# Patient Record
Sex: MALE | Race: ASIAN | NOT HISPANIC OR LATINO | URBAN - METROPOLITAN AREA
[De-identification: names, ages, dates, MRNs, and addresses within clinical notes are randomized per-mention and may not be internally consistent; named-entity substitution may affect disease eponyms.]

---

## 2022-09-07 ENCOUNTER — EMERGENCY (EMERGENCY)
Facility: HOSPITAL | Age: 42
LOS: 1 days | Discharge: HOME | End: 2022-09-07
Attending: EMERGENCY MEDICINE | Admitting: EMERGENCY MEDICINE
Payer: COMMERCIAL

## 2022-09-07 PROCEDURE — 99285 EMERGENCY DEPT VISIT HI MDM: CPT

## 2022-09-08 VITALS
WEIGHT: 184.09 LBS | DIASTOLIC BLOOD PRESSURE: 90 MMHG | RESPIRATION RATE: 18 BRPM | OXYGEN SATURATION: 99 % | HEART RATE: 89 BPM | TEMPERATURE: 99 F | SYSTOLIC BLOOD PRESSURE: 160 MMHG

## 2022-09-08 VITALS
HEART RATE: 57 BPM | DIASTOLIC BLOOD PRESSURE: 85 MMHG | OXYGEN SATURATION: 96 % | SYSTOLIC BLOOD PRESSURE: 130 MMHG | TEMPERATURE: 98 F | RESPIRATION RATE: 16 BRPM

## 2022-09-08 LAB
ALBUMIN SERPL ELPH-MCNC: 4 G/DL — SIGNIFICANT CHANGE UP (ref 3.3–5)
ALP SERPL-CCNC: 73 U/L — SIGNIFICANT CHANGE UP (ref 40–120)
ALT FLD-CCNC: 34 U/L — SIGNIFICANT CHANGE UP (ref 12–78)
ANION GAP SERPL CALC-SCNC: 6 MMOL/L — SIGNIFICANT CHANGE UP (ref 5–17)
APPEARANCE UR: ABNORMAL
AST SERPL-CCNC: 24 U/L — SIGNIFICANT CHANGE UP (ref 15–37)
BACTERIA # UR AUTO: ABNORMAL
BASOPHILS # BLD AUTO: 0.03 K/UL — SIGNIFICANT CHANGE UP (ref 0–0.2)
BASOPHILS NFR BLD AUTO: 0.2 % — SIGNIFICANT CHANGE UP (ref 0–2)
BILIRUB SERPL-MCNC: 0.4 MG/DL — SIGNIFICANT CHANGE UP (ref 0.2–1.2)
BILIRUB UR-MCNC: NEGATIVE — SIGNIFICANT CHANGE UP
BUN SERPL-MCNC: 18 MG/DL — SIGNIFICANT CHANGE UP (ref 7–23)
CALCIUM SERPL-MCNC: 9.2 MG/DL — SIGNIFICANT CHANGE UP (ref 8.5–10.1)
CHLORIDE SERPL-SCNC: 106 MMOL/L — SIGNIFICANT CHANGE UP (ref 96–108)
CO2 SERPL-SCNC: 27 MMOL/L — SIGNIFICANT CHANGE UP (ref 22–31)
COLOR SPEC: YELLOW — SIGNIFICANT CHANGE UP
CREAT SERPL-MCNC: 1.3 MG/DL — SIGNIFICANT CHANGE UP (ref 0.5–1.3)
DIFF PNL FLD: ABNORMAL
EGFR: 71 ML/MIN/1.73M2 — SIGNIFICANT CHANGE UP
EOSINOPHIL # BLD AUTO: 0.1 K/UL — SIGNIFICANT CHANGE UP (ref 0–0.5)
EOSINOPHIL NFR BLD AUTO: 0.8 % — SIGNIFICANT CHANGE UP (ref 0–6)
EPI CELLS # UR: NEGATIVE — SIGNIFICANT CHANGE UP
GLUCOSE SERPL-MCNC: 126 MG/DL — HIGH (ref 70–99)
GLUCOSE UR QL: NEGATIVE — SIGNIFICANT CHANGE UP
HCT VFR BLD CALC: 41 % — SIGNIFICANT CHANGE UP (ref 39–50)
HGB BLD-MCNC: 14.5 G/DL — SIGNIFICANT CHANGE UP (ref 13–17)
IMM GRANULOCYTES NFR BLD AUTO: 0.4 % — SIGNIFICANT CHANGE UP (ref 0–1.5)
KETONES UR-MCNC: NEGATIVE — SIGNIFICANT CHANGE UP
LEUKOCYTE ESTERASE UR-ACNC: ABNORMAL
LIDOCAIN IGE QN: 128 U/L — SIGNIFICANT CHANGE UP (ref 73–393)
LYMPHOCYTES # BLD AUTO: 1.93 K/UL — SIGNIFICANT CHANGE UP (ref 1–3.3)
LYMPHOCYTES # BLD AUTO: 15.5 % — SIGNIFICANT CHANGE UP (ref 13–44)
MCHC RBC-ENTMCNC: 30 PG — SIGNIFICANT CHANGE UP (ref 27–34)
MCHC RBC-ENTMCNC: 35.4 GM/DL — SIGNIFICANT CHANGE UP (ref 32–36)
MCV RBC AUTO: 84.9 FL — SIGNIFICANT CHANGE UP (ref 80–100)
MONOCYTES # BLD AUTO: 0.55 K/UL — SIGNIFICANT CHANGE UP (ref 0–0.9)
MONOCYTES NFR BLD AUTO: 4.4 % — SIGNIFICANT CHANGE UP (ref 2–14)
NEUTROPHILS # BLD AUTO: 9.76 K/UL — HIGH (ref 1.8–7.4)
NEUTROPHILS NFR BLD AUTO: 78.7 % — HIGH (ref 43–77)
NITRITE UR-MCNC: NEGATIVE — SIGNIFICANT CHANGE UP
NRBC # BLD: 0 /100 WBCS — SIGNIFICANT CHANGE UP (ref 0–0)
PH UR: 6.5 — SIGNIFICANT CHANGE UP (ref 5–8)
PLATELET # BLD AUTO: 197 K/UL — SIGNIFICANT CHANGE UP (ref 150–400)
POTASSIUM SERPL-MCNC: 4 MMOL/L — SIGNIFICANT CHANGE UP (ref 3.5–5.3)
POTASSIUM SERPL-SCNC: 4 MMOL/L — SIGNIFICANT CHANGE UP (ref 3.5–5.3)
PROT SERPL-MCNC: 7.7 G/DL — SIGNIFICANT CHANGE UP (ref 6–8.3)
PROT UR-MCNC: 15
RBC # BLD: 4.83 M/UL — SIGNIFICANT CHANGE UP (ref 4.2–5.8)
RBC # FLD: 12 % — SIGNIFICANT CHANGE UP (ref 10.3–14.5)
RBC CASTS # UR COMP ASSIST: ABNORMAL /HPF (ref 0–4)
SODIUM SERPL-SCNC: 139 MMOL/L — SIGNIFICANT CHANGE UP (ref 135–145)
SP GR SPEC: 1.01 — SIGNIFICANT CHANGE UP (ref 1.01–1.02)
UROBILINOGEN FLD QL: NEGATIVE — SIGNIFICANT CHANGE UP
WBC # BLD: 12.42 K/UL — HIGH (ref 3.8–10.5)
WBC # FLD AUTO: 12.42 K/UL — HIGH (ref 3.8–10.5)
WBC UR QL: ABNORMAL

## 2022-09-08 PROCEDURE — 99284 EMERGENCY DEPT VISIT MOD MDM: CPT | Mod: 25

## 2022-09-08 PROCEDURE — 85025 COMPLETE CBC W/AUTO DIFF WBC: CPT

## 2022-09-08 PROCEDURE — 87086 URINE CULTURE/COLONY COUNT: CPT

## 2022-09-08 PROCEDURE — 96375 TX/PRO/DX INJ NEW DRUG ADDON: CPT

## 2022-09-08 PROCEDURE — 80053 COMPREHEN METABOLIC PANEL: CPT

## 2022-09-08 PROCEDURE — 81001 URINALYSIS AUTO W/SCOPE: CPT

## 2022-09-08 PROCEDURE — 74176 CT ABD & PELVIS W/O CONTRAST: CPT | Mod: MA

## 2022-09-08 PROCEDURE — 36415 COLL VENOUS BLD VENIPUNCTURE: CPT

## 2022-09-08 PROCEDURE — 96361 HYDRATE IV INFUSION ADD-ON: CPT

## 2022-09-08 PROCEDURE — 96365 THER/PROPH/DIAG IV INF INIT: CPT

## 2022-09-08 PROCEDURE — 87186 SC STD MICRODIL/AGAR DIL: CPT

## 2022-09-08 PROCEDURE — 74176 CT ABD & PELVIS W/O CONTRAST: CPT | Mod: 26,MA

## 2022-09-08 PROCEDURE — 87077 CULTURE AEROBIC IDENTIFY: CPT

## 2022-09-08 PROCEDURE — 83690 ASSAY OF LIPASE: CPT

## 2022-09-08 RX ORDER — MORPHINE SULFATE 50 MG/1
4 CAPSULE, EXTENDED RELEASE ORAL ONCE
Refills: 0 | Status: DISCONTINUED | OUTPATIENT
Start: 2022-09-08 | End: 2022-09-08

## 2022-09-08 RX ORDER — TAMSULOSIN HYDROCHLORIDE 0.4 MG/1
1 CAPSULE ORAL
Qty: 10 | Refills: 0
Start: 2022-09-08 | End: 2022-09-17

## 2022-09-08 RX ORDER — SODIUM CHLORIDE 9 MG/ML
1000 INJECTION INTRAMUSCULAR; INTRAVENOUS; SUBCUTANEOUS
Refills: 0 | Status: COMPLETED | OUTPATIENT
Start: 2022-09-08 | End: 2022-09-08

## 2022-09-08 RX ORDER — CEFTRIAXONE 500 MG/1
1000 INJECTION, POWDER, FOR SOLUTION INTRAMUSCULAR; INTRAVENOUS ONCE
Refills: 0 | Status: COMPLETED | OUTPATIENT
Start: 2022-09-08 | End: 2022-09-08

## 2022-09-08 RX ORDER — CEFUROXIME AXETIL 250 MG
1 TABLET ORAL
Qty: 14 | Refills: 0
Start: 2022-09-08 | End: 2022-09-14

## 2022-09-08 RX ORDER — ONDANSETRON 8 MG/1
4 TABLET, FILM COATED ORAL ONCE
Refills: 0 | Status: COMPLETED | OUTPATIENT
Start: 2022-09-08 | End: 2022-09-08

## 2022-09-08 RX ORDER — OXYCODONE AND ACETAMINOPHEN 5; 325 MG/1; MG/1
2 TABLET ORAL
Qty: 36 | Refills: 0
Start: 2022-09-08 | End: 2022-09-10

## 2022-09-08 RX ADMIN — SODIUM CHLORIDE 1000 MILLILITER(S): 9 INJECTION INTRAMUSCULAR; INTRAVENOUS; SUBCUTANEOUS at 01:39

## 2022-09-08 RX ADMIN — MORPHINE SULFATE 4 MILLIGRAM(S): 50 CAPSULE, EXTENDED RELEASE ORAL at 01:39

## 2022-09-08 RX ADMIN — SODIUM CHLORIDE 1000 MILLILITER(S): 9 INJECTION INTRAMUSCULAR; INTRAVENOUS; SUBCUTANEOUS at 03:00

## 2022-09-08 RX ADMIN — SODIUM CHLORIDE 1000 MILLILITER(S): 9 INJECTION INTRAMUSCULAR; INTRAVENOUS; SUBCUTANEOUS at 02:46

## 2022-09-08 RX ADMIN — ONDANSETRON 4 MILLIGRAM(S): 8 TABLET, FILM COATED ORAL at 01:39

## 2022-09-08 RX ADMIN — MORPHINE SULFATE 4 MILLIGRAM(S): 50 CAPSULE, EXTENDED RELEASE ORAL at 02:09

## 2022-09-08 RX ADMIN — SODIUM CHLORIDE 1000 MILLILITER(S): 9 INJECTION INTRAMUSCULAR; INTRAVENOUS; SUBCUTANEOUS at 04:24

## 2022-09-08 RX ADMIN — CEFTRIAXONE 1000 MILLIGRAM(S): 500 INJECTION, POWDER, FOR SOLUTION INTRAMUSCULAR; INTRAVENOUS at 02:42

## 2022-09-08 RX ADMIN — CEFTRIAXONE 100 MILLIGRAM(S): 500 INJECTION, POWDER, FOR SOLUTION INTRAMUSCULAR; INTRAVENOUS at 02:08

## 2022-09-08 NOTE — ED PROVIDER NOTE - PATIENT PORTAL LINK FT
No You can access the FollowMyHealth Patient Portal offered by [facility] by registering at the following website: [email or web address]. By joining [employer]’s FollowMyHealth portal, you will also be able to view your health information using other applications (apps) compatible with our system.

## 2022-09-08 NOTE — ED ADULT TRIAGE NOTE - CHIEF COMPLAINT QUOTE
6yr old male arrived to ED c/o left flank pain and small amount of blood noted in urine, denies chest pain or shortness of breath

## 2022-09-08 NOTE — ED PROVIDER NOTE - CARE PROVIDER_API CALL
Alo Martínez)  Urology  [address], [address]  [place] [address]  Phone: [phone]  Fax: [phone]  Follow Up Time:

## 2022-09-08 NOTE — ED PROVIDER NOTE - CLINICAL SUMMARY MEDICAL DECISION MAKING FREE TEXT BOX
Patient is a 42-year-old male history of prior renal stones.  Patient states he awoke yesterday morning with left-sided flank pain that was colicky in nature and not worsened by any movements.  Pain initially was manageable with anti-inflammatories but returned yesterday evening.  Patient states pain radiates to left lower abdomen and has not increased by movement.  Patient denies fevers or chills nausea vomiting patient states tonight pain got very severe and came to the ER.  Pain is currently 4 out of 10 in intensity We will check labs, IV fluids, pain control and check UA as well as CT renal stone hunt to rule out kidney stone

## 2022-09-08 NOTE — ED ADULT NURSE NOTE - NSICDXFAMILYHX_GEN_ALL_CORE_FT
FAMILY HISTORY:  No pertinent family history in first degree relatives    
Leukocytosis (leucocytosis)

## 2022-09-08 NOTE — ED PROVIDER NOTE - NSFOLLOWUPINSTRUCTIONS_ED_ALL_ED_FT
Return immediately to the emergency department for shaking chills or fever over 100.5.  Drink lots of fluids.  Take Percocet 1 to 2 tablets every 4-6 hours as needed for moderate to severe pain.  Take Ceftin 1 tablet twice daily for 1 week.  Take Flomax 1 tablet before bed each night.  Call urology to arrange follow-up within the next 3 to 5 days.  Return as well for severe pain or uncontrolled vomiting 1) Follow-up with your Primary Medical Doctor , Dr Krishna. Call today for prompt follow-up.  2) Return to Emergency room for any worsening or persistent pain, weakness,  vomiting, diarrhea, unable to eat / drink, weak or dizzy, or any other concerning symptoms.   -If you have ANY FEVER, you must return to ED immediately  3) See attached instruction sheets for additional information, including information regarding signs and symptoms to look out for, reasons to seek immediate care and other important instructions.  4) Follow-up with Urology, see attached. Call ASAP for appointment.  5) Percocet as needed for pain, no driving / operating heavy machinery  6) Ceftin twice daily

## 2022-09-08 NOTE — ED PROVIDER NOTE - PROGRESS NOTE DETAILS
Reevaluated patient at bedside.  Patient feeling much improved, pain is well controlled.   At length dw pt - has not had any fevers. Discussed the results of all diagnostic testing in ED.  An opportunity to ask questions was given.  Discussed the nature of diagnostic testing in the abdomen and the importance of continued reevaluation.  Understanding of the potential risk of occult pathology was verbalized and the patient will continue to follow-up as an outpatient for further workup and evaluation until resolution.  Discussed the risks associated with kidney stones including the risk of developing an infected stone.  The patient understands that if they experience ANY fever, they will need to immediately return for possible surgical intervention. Discussed likelyhood of needing surg intervention due to size of stone. However, pt feeling well, wishes to go home now and will fu as outpt.  Discussed the importance of prompt, close medical follow-up.  Patient will return with any changes, concerns or persistent / worsening symptoms.  Understanding of all instructions verbalized.

## 2022-09-11 LAB
-  AMIKACIN: SIGNIFICANT CHANGE UP
-  AMOXICILLIN/CLAVULANIC ACID: SIGNIFICANT CHANGE UP
-  AMPICILLIN/SULBACTAM: SIGNIFICANT CHANGE UP
-  AMPICILLIN: SIGNIFICANT CHANGE UP
-  AMPICILLIN: SIGNIFICANT CHANGE UP
-  AZTREONAM: SIGNIFICANT CHANGE UP
-  CEFAZOLIN: SIGNIFICANT CHANGE UP
-  CEFEPIME: SIGNIFICANT CHANGE UP
-  CEFOXITIN: SIGNIFICANT CHANGE UP
-  CEFTRIAXONE: SIGNIFICANT CHANGE UP
-  CIPROFLOXACIN: SIGNIFICANT CHANGE UP
-  CIPROFLOXACIN: SIGNIFICANT CHANGE UP
-  ERTAPENEM: SIGNIFICANT CHANGE UP
-  GENTAMICIN: SIGNIFICANT CHANGE UP
-  IMIPENEM: SIGNIFICANT CHANGE UP
-  LEVOFLOXACIN: SIGNIFICANT CHANGE UP
-  LEVOFLOXACIN: SIGNIFICANT CHANGE UP
-  MEROPENEM: SIGNIFICANT CHANGE UP
-  NITROFURANTOIN: SIGNIFICANT CHANGE UP
-  NITROFURANTOIN: SIGNIFICANT CHANGE UP
-  PIPERACILLIN/TAZOBACTAM: SIGNIFICANT CHANGE UP
-  TETRACYCLINE: SIGNIFICANT CHANGE UP
-  TIGECYCLINE: SIGNIFICANT CHANGE UP
-  TOBRAMYCIN: SIGNIFICANT CHANGE UP
-  TRIMETHOPRIM/SULFAMETHOXAZOLE: SIGNIFICANT CHANGE UP
-  VANCOMYCIN: SIGNIFICANT CHANGE UP
CULTURE RESULTS: SIGNIFICANT CHANGE UP
METHOD TYPE: SIGNIFICANT CHANGE UP
METHOD TYPE: SIGNIFICANT CHANGE UP
ORGANISM # SPEC MICROSCOPIC CNT: SIGNIFICANT CHANGE UP
SPECIMEN SOURCE: SIGNIFICANT CHANGE UP

## 2022-09-11 NOTE — ED POST DISCHARGE NOTE - RESULT SUMMARY
UCx- Klebsiella Oxytoca/Raoutella/Enterococcus faecalis- pending sensitivities. Currently on Ceftin.

## 2023-01-11 ENCOUNTER — APPOINTMENT (OUTPATIENT)
Dept: UROLOGY | Facility: CLINIC | Age: 43
End: 2023-01-11

## 2023-01-11 PROBLEM — Z00.00 ENCOUNTER FOR PREVENTIVE HEALTH EXAMINATION: Status: ACTIVE | Noted: 2023-01-11

## 2023-02-23 PROBLEM — N20.0 CALCULUS OF KIDNEY: Chronic | Status: ACTIVE | Noted: 2022-09-08

## 2023-03-01 ENCOUNTER — OUTPATIENT (OUTPATIENT)
Dept: OUTPATIENT SERVICES | Facility: HOSPITAL | Age: 43
LOS: 1 days | End: 2023-03-01
Payer: COMMERCIAL

## 2023-03-01 ENCOUNTER — TRANSCRIPTION ENCOUNTER (OUTPATIENT)
Age: 43
End: 2023-03-01

## 2023-03-01 VITALS
RESPIRATION RATE: 16 BRPM | HEIGHT: 71 IN | HEART RATE: 82 BPM | OXYGEN SATURATION: 97 % | WEIGHT: 184.97 LBS | DIASTOLIC BLOOD PRESSURE: 95 MMHG | SYSTOLIC BLOOD PRESSURE: 129 MMHG | TEMPERATURE: 97 F

## 2023-03-01 DIAGNOSIS — Z01.818 ENCOUNTER FOR OTHER PREPROCEDURAL EXAMINATION: ICD-10-CM

## 2023-03-01 DIAGNOSIS — Z98.890 OTHER SPECIFIED POSTPROCEDURAL STATES: Chronic | ICD-10-CM

## 2023-03-01 DIAGNOSIS — N20.1 CALCULUS OF URETER: ICD-10-CM

## 2023-03-01 LAB
ANION GAP SERPL CALC-SCNC: 4 MMOL/L — LOW (ref 5–17)
APPEARANCE UR: CLEAR — SIGNIFICANT CHANGE UP
BILIRUB UR-MCNC: NEGATIVE — SIGNIFICANT CHANGE UP
BUN SERPL-MCNC: 10 MG/DL — SIGNIFICANT CHANGE UP (ref 7–23)
CALCIUM SERPL-MCNC: 9 MG/DL — SIGNIFICANT CHANGE UP (ref 8.5–10.1)
CHLORIDE SERPL-SCNC: 108 MMOL/L — SIGNIFICANT CHANGE UP (ref 96–108)
CO2 SERPL-SCNC: 28 MMOL/L — SIGNIFICANT CHANGE UP (ref 22–31)
COLOR SPEC: YELLOW — SIGNIFICANT CHANGE UP
CREAT SERPL-MCNC: 1 MG/DL — SIGNIFICANT CHANGE UP (ref 0.5–1.3)
DIFF PNL FLD: ABNORMAL
EGFR: 98 ML/MIN/1.73M2 — SIGNIFICANT CHANGE UP
GLUCOSE SERPL-MCNC: 110 MG/DL — HIGH (ref 70–99)
GLUCOSE UR QL: NEGATIVE — SIGNIFICANT CHANGE UP
HCT VFR BLD CALC: 43.7 % — SIGNIFICANT CHANGE UP (ref 39–50)
HGB BLD-MCNC: 15 G/DL — SIGNIFICANT CHANGE UP (ref 13–17)
KETONES UR-MCNC: NEGATIVE — SIGNIFICANT CHANGE UP
LEUKOCYTE ESTERASE UR-ACNC: ABNORMAL
MCHC RBC-ENTMCNC: 29.2 PG — SIGNIFICANT CHANGE UP (ref 27–34)
MCHC RBC-ENTMCNC: 34.3 GM/DL — SIGNIFICANT CHANGE UP (ref 32–36)
MCV RBC AUTO: 85 FL — SIGNIFICANT CHANGE UP (ref 80–100)
NITRITE UR-MCNC: NEGATIVE — SIGNIFICANT CHANGE UP
NRBC # BLD: 0 /100 WBCS — SIGNIFICANT CHANGE UP (ref 0–0)
PH UR: 6 — SIGNIFICANT CHANGE UP (ref 5–8)
PLATELET # BLD AUTO: 200 K/UL — SIGNIFICANT CHANGE UP (ref 150–400)
POTASSIUM SERPL-MCNC: 4.1 MMOL/L — SIGNIFICANT CHANGE UP (ref 3.5–5.3)
POTASSIUM SERPL-SCNC: 4.1 MMOL/L — SIGNIFICANT CHANGE UP (ref 3.5–5.3)
PROT UR-MCNC: 30 MG/DL
RBC # BLD: 5.14 M/UL — SIGNIFICANT CHANGE UP (ref 4.2–5.8)
RBC # FLD: 12.3 % — SIGNIFICANT CHANGE UP (ref 10.3–14.5)
SODIUM SERPL-SCNC: 140 MMOL/L — SIGNIFICANT CHANGE UP (ref 135–145)
SP GR SPEC: 1.02 — SIGNIFICANT CHANGE UP (ref 1.01–1.02)
UROBILINOGEN FLD QL: NEGATIVE — SIGNIFICANT CHANGE UP
WBC # BLD: 6.73 K/UL — SIGNIFICANT CHANGE UP (ref 3.8–10.5)
WBC # FLD AUTO: 6.73 K/UL — SIGNIFICANT CHANGE UP (ref 3.8–10.5)

## 2023-03-01 PROCEDURE — G0463: CPT

## 2023-03-01 PROCEDURE — 85027 COMPLETE CBC AUTOMATED: CPT

## 2023-03-01 PROCEDURE — 87086 URINE CULTURE/COLONY COUNT: CPT

## 2023-03-01 PROCEDURE — 36415 COLL VENOUS BLD VENIPUNCTURE: CPT

## 2023-03-01 PROCEDURE — 81001 URINALYSIS AUTO W/SCOPE: CPT

## 2023-03-01 PROCEDURE — 80048 BASIC METABOLIC PNL TOTAL CA: CPT

## 2023-03-01 NOTE — H&P PST ADULT - PROBLEM SELECTOR PLAN 1
Right flexible ureteroscopy - Laser  Pre op instructions:    medical eval advised, EKG done at PCP   Vaccinated for Covid   Take all prescribed meds as usual  Hold OTC supplements.   May take Tylenol for pain or headache if needed.    NPO after 11pm to the morning of surgery.   Covid testing advised 3 days prior to procedure, information given.  Patient verbalized understanding.

## 2023-03-01 NOTE — H&P PST ADULT - NSICDXPASTMEDICALHX_GEN_ALL_CORE_FT
PAST MEDICAL HISTORY:  Renal stone      PAST MEDICAL HISTORY:  CHELSEA (obstructive sleep apnea)     Renal stone     Tachycardia

## 2023-03-01 NOTE — H&P PST ADULT - NSICDXFAMILYHX_GEN_ALL_CORE_FT
FAMILY HISTORY:  Father  Still living? Yes, Estimated age: 61-70  FH: type 2 diabetes, Age at diagnosis: Age Unknown

## 2023-03-01 NOTE — H&P PST ADULT - NS [place] CAGE ALCOHOL ANNOYED YOU
Interval History:     Review of Systems   Constitution: Negative for decreased appetite.   HENT: Negative for ear discharge.    Eyes: Negative for blurred vision.   Endocrine: Negative for polyphagia.   Skin: Negative for nail changes.   Neurological: Negative for aphonia.   Psychiatric/Behavioral: Negative for hallucinations.     Objective:     Vital Signs (Most Recent):  Temp: 97.4 °F (36.3 °C) (01/19/18 0730)  Pulse: 82 (01/19/18 0730)  Resp: 18 (01/19/18 0730)  BP: (!) 154/99 (01/19/18 0730)  SpO2: 99 % (01/19/18 0730) Vital Signs (24h Range):  Temp:  [97.2 °F (36.2 °C)-98.3 °F (36.8 °C)] 97.4 °F (36.3 °C)  Pulse:  [] 82  Resp:  [16-69] 18  SpO2:  [96 %-100 %] 99 %  BP: (112-166)/(65-99) 154/99     Weight: 71 kg (156 lb 8.4 oz)  Body mass index is 25.26 kg/m².     SpO2: 99 %  O2 Device (Oxygen Therapy): room air      Intake/Output Summary (Last 24 hours) at 01/19/18 0843  Last data filed at 01/19/18 0458   Gross per 24 hour   Intake          1972.25 ml   Output              750 ml   Net          1222.25 ml       Lines/Drains/Airways     Peripheral Intravenous Line                 Peripheral IV - Single Lumen 01/17/18 1653 Right Wrist 1 day                Physical Exam   Constitutional: He is oriented to person, place, and time. He appears well-developed and well-nourished.   HENT:   Head: Normocephalic and atraumatic.   Eyes: Conjunctivae and EOM are normal. Pupils are equal, round, and reactive to light.   Neck: Normal range of motion. Neck supple. No thyromegaly present.   Cardiovascular: Normal rate, regular rhythm and normal heart sounds.    No murmur heard.  Pulmonary/Chest: Effort normal and breath sounds normal. No respiratory distress. He has no wheezes. He has no rales. He exhibits no tenderness.   Abdominal: Soft. Bowel sounds are normal.   Musculoskeletal: He exhibits no edema.   Neurological: He is alert and oriented to person, place, and time.   Skin: Skin is warm.   Psychiatric: He has a  normal mood and affect. His behavior is normal.       Significant Labs: All pertinent lab results from the last 24 hours have been reviewed.    Significant Imaging: Echocardiogram:   2D echo with color flow doppler:   Results for orders placed or performed during the hospital encounter of 01/17/18   2D echo with color flow doppler   Result Value Ref Range    EF 20 (A) 55 - 65    Mitral Valve Regurgitation MODERATE TO SEVERE (A)     Est. PA Systolic Pressure 45.45 (A)     Pericardial Effusion NONE     Tricuspid Valve Regurgitation MILD TO MODERATE       no

## 2023-03-01 NOTE — H&P PST ADULT - MUSCULOSKELETAL
normal/ROM intact/normal gait/strength 5/5 bilateral upper extremities/strength 5/5 bilateral lower extremities negative normal/ROM intact/normal gait/strength 5/5 bilateral upper extremities/strength 5/5 bilateral lower extremities/back exam

## 2023-03-01 NOTE — H&P PST ADULT - HISTORY OF PRESENT ILLNESS
43 y/o male, PMH of HTN, HLD with c/o of right flank pain for 6 months. Was seen in the ER in sep 2022, CT scan showed right kidney stone. Seen by Dr Martínez, few weeks ago, scheduled for right flexible ureteroscopy,  laser on 3/8/23. Pre op testing today.   43 y/o male, PMH of HTN, HLD with c/o of right flank pain for 6 months. Was seen in the ER in sep 2022, CT scan showed right kidney stone. Seen by Dr Martínez, few weeks ago, cat scan repeated, showing right hydronephrosis  ands ureteral stone scheduled for right flexible ureteroscopy,  laser on 3/8/23. Pre op testing today.

## 2023-03-02 PROBLEM — G47.33 OBSTRUCTIVE SLEEP APNEA (ADULT) (PEDIATRIC): Chronic | Status: ACTIVE | Noted: 2023-03-01

## 2023-03-02 PROBLEM — R00.0 TACHYCARDIA, UNSPECIFIED: Chronic | Status: ACTIVE | Noted: 2023-03-01

## 2023-03-02 LAB
CULTURE RESULTS: SIGNIFICANT CHANGE UP
SPECIMEN SOURCE: SIGNIFICANT CHANGE UP

## 2023-03-06 ENCOUNTER — OUTPATIENT (OUTPATIENT)
Dept: OUTPATIENT SERVICES | Facility: HOSPITAL | Age: 43
LOS: 1 days | End: 2023-03-06
Payer: COMMERCIAL

## 2023-03-06 DIAGNOSIS — Z98.890 OTHER SPECIFIED POSTPROCEDURAL STATES: Chronic | ICD-10-CM

## 2023-03-06 DIAGNOSIS — Z20.828 CONTACT WITH AND (SUSPECTED) EXPOSURE TO OTHER VIRAL COMMUNICABLE DISEASES: ICD-10-CM

## 2023-03-06 LAB — SARS-COV-2 RNA SPEC QL NAA+PROBE: SIGNIFICANT CHANGE UP

## 2023-03-06 PROCEDURE — U0005: CPT

## 2023-03-06 PROCEDURE — U0003: CPT

## 2023-03-07 ENCOUNTER — TRANSCRIPTION ENCOUNTER (OUTPATIENT)
Age: 43
End: 2023-03-07

## 2023-03-07 NOTE — ASU PATIENT PROFILE, ADULT - TRANSFUSION HX COMMENT, PROFILE
Chief Complaint   Patient presents with    3 Month Follow-Up    Hypertension       HPI: Patient is here today to follow-up hypertension and other medical issues he seems better he does not comment as much on the pelvic floor dysfunction his mood is better and he says his blood pressure is doing okay the main reason he is here for follow-up today is hypertension he follows with Dr. Nakul Back for depression bipolar disorder.     Past Medical History:   Diagnosis Date    Arthritis     Bipolar disorder ([address]     BPH (benign prostatic hypertrophy)     Constipation     Depression     GERD (gastroesophageal reflux disease)     Headache(784.0)     History of blood transfusion     Hypertension     Pelvic floor dysfunction        Past Surgical History:   Procedure Laterality Date    ACHILLES TENDON SURGERY      CHOLECYSTECTOMY      COLONOSCOPY N/A 3/14/2019    Dr Everett Pastures yr recall    ENDOSCOPY, COLON, DIAGNOSTIC      JOINT REPLACEMENT      left ankle replacement    TONSILLECTOMY AND ADENOIDECTOMY         Family History   Problem Relation Age of Onset    Colon Cancer Neg Hx     Colon Polyps Neg Hx     Liver Cancer Neg Hx     Liver Disease Neg Hx     Esophageal Cancer Neg Hx     Stomach Cancer Neg Hx     Rectal Cancer Neg Hx        Social History     Socioeconomic History    Marital status:      Spouse name: Not on file    Number of children: Not on file    Years of education: Not on file    Highest education level: Not on file   Occupational History    Not on file   Social Needs    Financial resource strain: Not hard at all   Offermatic insecurity     Worry: Never true     Inability: Never true   [employer] needs     Medical: Not on file     Non-medical: Not on file   Tobacco Use    Smoking status: Current Every Day Smoker     Packs/day: 0.25     Years: 30.00     Pack years: 7.50     Types: Cigarettes    Smokeless tobacco: Never Used    Tobacco comment: 4 cigarettes per day   Substance and Sexual Activity    Alcohol use: No    Drug use: No    Sexual activity: Not on file   Lifestyle    Physical activity     Days per week: Not on file     Minutes per session: Not on file    Stress: Not on file   Relationships    Social connections     Talks on phone: Not on file     Gets together: Not on file     Attends Christian service: Not on file     Active member of club or organization: Not on file     Attends meetings of clubs or organizations: Not on file     Relationship status: Not on file    Intimate partner violence     Fear of current or ex partner: Not on file     Emotionally abused: Not on file     Physically abused: Not on file     Forced sexual activity: Not on file   Other Topics Concern    Not on file   Social History Narrative    Not on file       No Known Allergies    Current Outpatient Medications   Medication Sig Dispense Refill    amLODIPine (NORVASC) 5 MG tablet Take 1 tablet by mouth nightly 30 tablet 5    sildenafil (VIAGRA) 50 MG tablet Take 1 tablet by mouth as needed for Erectile Dysfunction (do not exceed 1 in 24h) 30 tablet 0    Prucalopride Succinate (MOTEGRITY) 2 MG TABS TAKE 1 TABLET BY MOUTH DAILY 30 tablet 3    losartan-hydroCHLOROthiazide (HYZAAR) 100-25 MG per tablet Take 1 tablet by mouth daily 90 tablet 3    RABEprazole (ACIPHEX) 20 MG tablet TAKE 1 TABLET DAILY 90 tablet 3    buPROPion (WELLBUTRIN XL) 300 MG extended release tablet TAKE 1 TABLET BY MOUTH EVERY MORNING 90 tablet 3    lamoTRIgine (LAMICTAL) 200 MG tablet TAKE 1 TABLET BY MOUTH DAILY 90 tablet 3    SUMAtriptan (IMITREX) 100 MG tablet Take 1 tablet by mouth as needed for Migraine 9 tablet 3    folic acid (FOLVITE) 313 MCG tablet Take 400 mcg by mouth daily      aspirin 325 MG tablet Take 325 mg by mouth daily      Multiple Vitamins-Minerals (MULTIVITAMIN ADULT PO) Take 1 tablet by mouth daily       LORazepam (ATIVAN) 0.5 MG tablet TAKE 1 TABLET BY MOUTH EVERY Ativan    3.  Pelvic floor dysfunction  He takes [address] for this and he has not really complained of this since he has been on [employer] I think it is working well    Recommend the COVID-19 vaccine he had the J&J vaccine on March 16 pt. consents to blood transfusion if needed

## 2023-03-08 ENCOUNTER — TRANSCRIPTION ENCOUNTER (OUTPATIENT)
Age: 43
End: 2023-03-08

## 2023-03-08 ENCOUNTER — OUTPATIENT (OUTPATIENT)
Dept: OUTPATIENT SERVICES | Facility: HOSPITAL | Age: 43
LOS: 1 days | End: 2023-03-08
Payer: COMMERCIAL

## 2023-03-08 VITALS
TEMPERATURE: 98 F | WEIGHT: 188.05 LBS | DIASTOLIC BLOOD PRESSURE: 85 MMHG | OXYGEN SATURATION: 95 % | RESPIRATION RATE: 16 BRPM | HEART RATE: 86 BPM | SYSTOLIC BLOOD PRESSURE: 121 MMHG

## 2023-03-08 VITALS
RESPIRATION RATE: 14 BRPM | OXYGEN SATURATION: 96 % | DIASTOLIC BLOOD PRESSURE: 72 MMHG | HEART RATE: 76 BPM | SYSTOLIC BLOOD PRESSURE: 127 MMHG

## 2023-03-08 DIAGNOSIS — Z98.890 OTHER SPECIFIED POSTPROCEDURAL STATES: Chronic | ICD-10-CM

## 2023-03-08 DIAGNOSIS — N20.1 CALCULUS OF URETER: ICD-10-CM

## 2023-03-08 PROCEDURE — C1769: CPT

## 2023-03-08 PROCEDURE — 76000 FLUOROSCOPY <1 HR PHYS/QHP: CPT

## 2023-03-08 PROCEDURE — 52005 CYSTO W/URTRL CATHJ: CPT | Mod: 74

## 2023-03-08 PROCEDURE — C1758: CPT

## 2023-03-08 DEVICE — GUIDEWIRE SENSOR NITINOL STRAIGHT .035" X 150CM: Type: IMPLANTABLE DEVICE | Site: RIGHT | Status: FUNCTIONAL

## 2023-03-08 DEVICE — URETERAL CATH OPEN END FLEXI-TIP 5FR .038" X 70CM: Type: IMPLANTABLE DEVICE | Site: RIGHT | Status: FUNCTIONAL

## 2023-03-08 DEVICE — GUIDEWIRE ZIPWIRE STANDARD STRAIGHT .038" X 150CM: Type: IMPLANTABLE DEVICE | Site: RIGHT | Status: FUNCTIONAL

## 2023-03-08 RX ORDER — ONDANSETRON 8 MG/1
4 TABLET, FILM COATED ORAL ONCE
Refills: 0 | Status: DISCONTINUED | OUTPATIENT
Start: 2023-03-08 | End: 2023-03-08

## 2023-03-08 RX ORDER — SODIUM CHLORIDE 9 MG/ML
1000 INJECTION, SOLUTION INTRAVENOUS
Refills: 0 | Status: DISCONTINUED | OUTPATIENT
Start: 2023-03-08 | End: 2023-03-08

## 2023-03-08 RX ORDER — CEFAZOLIN SODIUM 1 G
VIAL (EA) INJECTION
Refills: 0 | Status: DISCONTINUED | OUTPATIENT
Start: 2023-03-08 | End: 2023-03-08

## 2023-03-08 RX ORDER — CEFAZOLIN SODIUM 1 G
2000 VIAL (EA) INJECTION ONCE
Refills: 0 | Status: COMPLETED | OUTPATIENT
Start: 2023-03-08 | End: 2023-03-08

## 2023-03-08 RX ORDER — OXYCODONE HYDROCHLORIDE 5 MG/1
5 TABLET ORAL ONCE
Refills: 0 | Status: DISCONTINUED | OUTPATIENT
Start: 2023-03-08 | End: 2023-03-08

## 2023-03-08 RX ORDER — HYDROMORPHONE HYDROCHLORIDE 2 MG/ML
0.5 INJECTION INTRAMUSCULAR; INTRAVENOUS; SUBCUTANEOUS
Refills: 0 | Status: DISCONTINUED | OUTPATIENT
Start: 2023-03-08 | End: 2023-03-08

## 2023-03-08 RX ORDER — CEFUROXIME AXETIL 250 MG
1 TABLET ORAL
Qty: 10 | Refills: 0
Start: 2023-03-08 | End: 2023-03-12

## 2023-03-08 RX ADMIN — SODIUM CHLORIDE 60 MILLILITER(S): 9 INJECTION, SOLUTION INTRAVENOUS at 12:19

## 2023-03-08 RX ADMIN — SODIUM CHLORIDE 75 MILLILITER(S): 9 INJECTION, SOLUTION INTRAVENOUS at 14:52

## 2023-03-08 NOTE — ASU DISCHARGE PLAN (ADULT/PEDIATRIC) - NS MD DC FALL RISK RISK
For information on Fall & Injury Prevention, visit: [email or web address] OR  [email or web address] OR  https://www.cdc.gov/steadi/patient.html

## 2023-03-08 NOTE — BRIEF OPERATIVE NOTE - OPERATION/FINDINGS
large proximal  rt ureteral calculus, stone could not be bypassed with guide wire, [id number] terminated

## 2023-03-08 NOTE — ASU DISCHARGE PLAN (ADULT/PEDIATRIC) - CARE PROVIDER_API CALL
Alo Martínez)  Urology  [address], [address]  [place] [address]  Phone: [phone]  Fax: [phone]  Follow Up Time: 1 week

## 2023-04-28 ENCOUNTER — APPOINTMENT (OUTPATIENT)
Dept: ORTHOPEDIC SURGERY | Facility: CLINIC | Age: 43
End: 2023-04-28
Payer: COMMERCIAL

## 2023-04-28 DIAGNOSIS — M75.41 IMPINGEMENT SYNDROME OF RIGHT SHOULDER: ICD-10-CM

## 2023-04-28 PROCEDURE — 99204 OFFICE O/P NEW MOD 45 MIN: CPT

## 2023-04-28 PROCEDURE — 73030 X-RAY EXAM OF SHOULDER: CPT | Mod: RT

## 2023-04-28 RX ORDER — METFORMIN HYDROCHLORIDE 500 MG/1
240 TABLET, EXTENDED RELEASE ORAL
Refills: 0 | Status: ACTIVE | COMMUNITY

## 2023-04-28 NOTE — HISTORY OF PRESENT ILLNESS
[Left Arm] : left arm [Right Arm] : right arm [Gradual] : gradual [Sudden] : sudden [7] : 7 [6] : 6 [Burning] : burning [Shooting] : shooting [Stabbing] : stabbing [Intermittent] : intermittent [Rest] : rest [Exercising] : exercising [de-identified] : 4/28/23  Initial visit for this 43 year male RHD c/o numbness lt index finger only at night x last 1 months duration. No wake up pain at night. Has no sxs in other fingers.\par \par  Also c/o rt shoulder pain when attempting to throw overhead x last 6-8 months duration. No pain at rest. Can lie on rt side at night. NO wake up pain at night. 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

## 2023-04-28 NOTE — REASON FOR VISIT
[FreeTextEntry2] : Follow up visit for a new injury to the left pointer finger and the right shoulder

## 2023-05-14 ENCOUNTER — FORM ENCOUNTER (OUTPATIENT)
Age: 43
End: 2023-05-14

## 2023-05-15 ENCOUNTER — APPOINTMENT (OUTPATIENT)
Dept: MRI IMAGING | Facility: CLINIC | Age: 43
End: 2023-05-15
Payer: COMMERCIAL

## 2023-05-15 PROCEDURE — 73221 MRI JOINT UPR EXTREM W/O DYE: CPT | Mod: RT

## 2023-05-19 ENCOUNTER — APPOINTMENT (OUTPATIENT)
Dept: ORTHOPEDIC SURGERY | Facility: CLINIC | Age: 43
End: 2023-05-19

## 2023-05-23 ENCOUNTER — APPOINTMENT (OUTPATIENT)
Dept: ORTHOPEDIC SURGERY | Facility: CLINIC | Age: 43
End: 2023-05-23

## 2023-08-29 ENCOUNTER — OUTPATIENT (OUTPATIENT)
Dept: OUTPATIENT SERVICES | Facility: HOSPITAL | Age: 43
LOS: 1 days | End: 2023-08-29
Payer: COMMERCIAL

## 2023-08-29 VITALS
DIASTOLIC BLOOD PRESSURE: 86 MMHG | WEIGHT: 119.05 LBS | TEMPERATURE: 98 F | SYSTOLIC BLOOD PRESSURE: 148 MMHG | HEIGHT: 70 IN | RESPIRATION RATE: 16 BRPM | HEART RATE: 90 BPM | OXYGEN SATURATION: 98 %

## 2023-08-29 DIAGNOSIS — Z86.69 PERSONAL HISTORY OF OTHER DISEASES OF THE NERVOUS SYSTEM AND SENSE ORGANS: ICD-10-CM

## 2023-08-29 DIAGNOSIS — Z98.890 OTHER SPECIFIED POSTPROCEDURAL STATES: Chronic | ICD-10-CM

## 2023-08-29 DIAGNOSIS — N20.0 CALCULUS OF KIDNEY: ICD-10-CM

## 2023-08-29 DIAGNOSIS — N20.1 CALCULUS OF URETER: ICD-10-CM

## 2023-08-29 LAB
ANION GAP SERPL CALC-SCNC: 10 MMOL/L — SIGNIFICANT CHANGE UP (ref 7–14)
APPEARANCE UR: CLEAR — SIGNIFICANT CHANGE UP
BACTERIA # UR AUTO: NEGATIVE /HPF — SIGNIFICANT CHANGE UP
BILIRUB UR-MCNC: NEGATIVE — SIGNIFICANT CHANGE UP
BLD GP AB SCN SERPL QL: NEGATIVE — SIGNIFICANT CHANGE UP
BUN SERPL-MCNC: 12 MG/DL — SIGNIFICANT CHANGE UP (ref 7–23)
CALCIUM SERPL-MCNC: 9.3 MG/DL — SIGNIFICANT CHANGE UP (ref 8.4–10.5)
CAST: 0 /LPF — SIGNIFICANT CHANGE UP (ref 0–4)
CHLORIDE SERPL-SCNC: 102 MMOL/L — SIGNIFICANT CHANGE UP (ref 98–107)
CO2 SERPL-SCNC: 30 MMOL/L — SIGNIFICANT CHANGE UP (ref 22–31)
COLOR SPEC: SIGNIFICANT CHANGE UP
CREAT SERPL-MCNC: 1.01 MG/DL — SIGNIFICANT CHANGE UP (ref 0.5–1.3)
DIFF PNL FLD: NEGATIVE — SIGNIFICANT CHANGE UP
EGFR: 96 ML/MIN/1.73M2 — SIGNIFICANT CHANGE UP
GLUCOSE SERPL-MCNC: 120 MG/DL — HIGH (ref 70–99)
GLUCOSE UR QL: NEGATIVE MG/DL — SIGNIFICANT CHANGE UP
HCT VFR BLD CALC: 43.4 % — SIGNIFICANT CHANGE UP (ref 39–50)
HGB BLD-MCNC: 15.1 G/DL — SIGNIFICANT CHANGE UP (ref 13–17)
KETONES UR-MCNC: NEGATIVE MG/DL — SIGNIFICANT CHANGE UP
LEUKOCYTE ESTERASE UR-ACNC: ABNORMAL
MCHC RBC-ENTMCNC: 29.3 PG — SIGNIFICANT CHANGE UP (ref 27–34)
MCHC RBC-ENTMCNC: 34.8 GM/DL — SIGNIFICANT CHANGE UP (ref 32–36)
MCV RBC AUTO: 84.3 FL — SIGNIFICANT CHANGE UP (ref 80–100)
NITRITE UR-MCNC: NEGATIVE — SIGNIFICANT CHANGE UP
NRBC # BLD: 0 /100 WBCS — SIGNIFICANT CHANGE UP (ref 0–0)
NRBC # FLD: 0 K/UL — SIGNIFICANT CHANGE UP (ref 0–0)
PH UR: 6.5 — SIGNIFICANT CHANGE UP (ref 5–8)
PLATELET # BLD AUTO: 196 K/UL — SIGNIFICANT CHANGE UP (ref 150–400)
POTASSIUM SERPL-MCNC: 3.6 MMOL/L — SIGNIFICANT CHANGE UP (ref 3.5–5.3)
POTASSIUM SERPL-SCNC: 3.6 MMOL/L — SIGNIFICANT CHANGE UP (ref 3.5–5.3)
PROT UR-MCNC: SIGNIFICANT CHANGE UP MG/DL
RBC # BLD: 5.15 M/UL — SIGNIFICANT CHANGE UP (ref 4.2–5.8)
RBC # FLD: 12.1 % — SIGNIFICANT CHANGE UP (ref 10.3–14.5)
RBC CASTS # UR COMP ASSIST: 2 /HPF — SIGNIFICANT CHANGE UP (ref 0–4)
RH IG SCN BLD-IMP: POSITIVE — SIGNIFICANT CHANGE UP
SODIUM SERPL-SCNC: 142 MMOL/L — SIGNIFICANT CHANGE UP (ref 135–145)
SP GR SPEC: 1.02 — SIGNIFICANT CHANGE UP (ref 1–1.03)
SQUAMOUS # UR AUTO: 0 /HPF — SIGNIFICANT CHANGE UP (ref 0–5)
UROBILINOGEN FLD QL: 0.2 MG/DL — SIGNIFICANT CHANGE UP (ref 0.2–1)
WBC # BLD: 7.24 K/UL — SIGNIFICANT CHANGE UP (ref 3.8–10.5)
WBC # FLD AUTO: 7.24 K/UL — SIGNIFICANT CHANGE UP (ref 3.8–10.5)
WBC UR QL: 11 /HPF — HIGH (ref 0–5)

## 2023-08-29 PROCEDURE — 93010 ELECTROCARDIOGRAM REPORT: CPT

## 2023-08-29 RX ORDER — ROSUVASTATIN CALCIUM 5 MG/1
1 TABLET ORAL
Qty: 0 | Refills: 0 | DISCHARGE

## 2023-08-29 NOTE — H&P PST ADULT - NEGATIVE ENMT SYMPTOMS
no hearing difficulty/no ear pain/no tinnitus/no vertigo/no nasal congestion/no nasal discharge/no nasal obstruction/no nose bleeds

## 2023-08-29 NOTE — H&P PST ADULT - MUSCULOSKELETAL
negative normal/ROM intact/normal gait/strength 5/5 bilateral upper extremities/strength 5/5 bilateral lower extremities/back exam details…

## 2023-08-29 NOTE — H&P PST ADULT - NSICDXPASTMEDICALHX_GEN_ALL_CORE_FT
PAST MEDICAL HISTORY:  CHELSEA (obstructive sleep apnea)     Renal stone     Tachycardia      PAST MEDICAL HISTORY:  HLD (hyperlipidemia)     HTN (hypertension)     CHELSEA (obstructive sleep apnea)     Renal stone     Tachycardia

## 2023-08-29 NOTE — H&P PST ADULT - NEGATIVE SKIN SYMPTOMS
"Initial /67  Pulse 58  Temp 98.4  F (36.9  C) (Tympanic)  Ht 5' 7\" (1.702 m)  Wt 159 lb (72.1 kg)  BMI 24.9 kg/m2 Estimated body mass index is 24.9 kg/(m^2) as calculated from the following:    Height as of this encounter: 5' 7\" (1.702 m).    Weight as of this encounter: 159 lb (72.1 kg). .    Anabelle Fierro CMA ([facility])  "
no rash/no itching/no dryness

## 2023-08-29 NOTE — H&P PST ADULT - HISTORY OF PRESENT ILLNESS
43 y/o male, PMH of HTN, HLD with c/o of right flank pain for 6 months. Was seen in the ER in sep 2022, CT scan showed right kidney stone. Seen by Dr Martínez, few weeks ago, cat scan repeated, showing right hydronephrosis  ands ureteral stone scheduled for right flexible ureteroscopy,  laser on 3/8/23. Pre op testing today.   44 y/o male, PMH of HTN, HLD with c/o of right flank pain for 6 months. Was seen in the ER in sep 2022, CT scan showed right kidney stone. Seen by Dr Martínez, cat scan repeated, showing right hydronephrosis  ands ureteral stone. Pt had  right flexible ureteroscopy,  laser on 3/8/23 at [facility] but as per pt the stone could not be removed. Pt presents for preop eval to have right percutaneous nephrolithotomy with fluoroscopy on 9/14/23.

## 2023-08-29 NOTE — H&P PST ADULT - PROBLEM SELECTOR PLAN 2
CPAP machine was not advised for th ept - as per the pt.  Intermediate risk for CHELSEA identified by screening tool.   Airway precautions recommended.

## 2023-08-29 NOTE — H&P PST ADULT - PROBLEM SELECTOR PLAN 1
Right flexible ureteroscopy - Laser  Pre op instructions:    medical eval advised, EKG done at PCP   Vaccinated for Covid   Take all prescribed meds as usual  Hold OTC supplements.   May take Tylenol for pain or headache if needed.    NPO after 11pm to the morning of surgery.   Covid testing advised 3 days prior to procedure, information given.  Patient verbalized understanding. Pt presents for preop eval to have right percutaneous nephrolithotomy with fluoroscopy on 9/14/23.   labs pending, ekg in chart.  Preop instructions provided to pt.  Famotidine and chlorhexidine scrubs provided to pt with instructions.

## 2023-08-29 NOTE — H&P PST ADULT - NSICDXPASTSURGICALHX_GEN_ALL_CORE_FT
PAST SURGICAL HISTORY:  S/P left inguinal hernia repair      PAST SURGICAL HISTORY:  History of ureteroscopy     S/P left inguinal hernia repair

## 2023-08-31 LAB
CULTURE RESULTS: NO GROWTH — SIGNIFICANT CHANGE UP
SPECIMEN SOURCE: SIGNIFICANT CHANGE UP

## 2023-09-12 ENCOUNTER — RESULT REVIEW (OUTPATIENT)
Age: 43
End: 2023-09-12

## 2023-09-12 ENCOUNTER — APPOINTMENT (OUTPATIENT)
Dept: INTERVENTIONAL RADIOLOGY/VASCULAR | Facility: CLINIC | Age: 43
End: 2023-09-12
Payer: MEDICARE

## 2023-09-12 VITALS — HEIGHT: 70.5 IN | BODY MASS INDEX: 25.9 KG/M2 | WEIGHT: 182.98 LBS

## 2023-09-12 DIAGNOSIS — Z84.1 FAMILY HISTORY OF DISORDERS OF KIDNEY AND URETER: ICD-10-CM

## 2023-09-12 DIAGNOSIS — Z78.9 OTHER SPECIFIED HEALTH STATUS: ICD-10-CM

## 2023-09-12 DIAGNOSIS — I10 ESSENTIAL (PRIMARY) HYPERTENSION: ICD-10-CM

## 2023-09-12 DIAGNOSIS — N20.0 CALCULUS OF KIDNEY: ICD-10-CM

## 2023-09-12 PROBLEM — E78.5 HYPERLIPIDEMIA, UNSPECIFIED: Chronic | Status: ACTIVE | Noted: 2023-08-29

## 2023-09-12 PROCEDURE — 99203 OFFICE O/P NEW LOW 30 MIN: CPT | Mod: 95

## 2023-09-13 ENCOUNTER — APPOINTMENT (OUTPATIENT)
Dept: CT IMAGING | Facility: HOSPITAL | Age: 43
End: 2023-09-13

## 2023-09-13 ENCOUNTER — TRANSCRIPTION ENCOUNTER (OUTPATIENT)
Age: 43
End: 2023-09-13

## 2023-09-13 ENCOUNTER — INPATIENT (INPATIENT)
Facility: HOSPITAL | Age: 43
LOS: 0 days | Discharge: HOME | End: 2023-09-14
Attending: UROLOGY | Admitting: UROLOGY
Payer: COMMERCIAL

## 2023-09-13 ENCOUNTER — OUTPATIENT (OUTPATIENT)
Dept: OUTPATIENT SERVICES | Facility: HOSPITAL | Age: 43
LOS: 1 days | End: 2023-09-13

## 2023-09-13 VITALS — TEMPERATURE: 98 F | RESPIRATION RATE: 11 BRPM | HEART RATE: 77 BPM | OXYGEN SATURATION: 98 %

## 2023-09-13 DIAGNOSIS — Z98.890 OTHER SPECIFIED POSTPROCEDURAL STATES: Chronic | ICD-10-CM

## 2023-09-13 DIAGNOSIS — N20.0 CALCULUS OF KIDNEY: ICD-10-CM

## 2023-09-13 PROCEDURE — 50433 PLMT NEPHROURETERAL CATHETER: CPT | Mod: RT

## 2023-09-13 RX ORDER — INFLUENZA VIRUS VACCINE 15; 15; 15; 15 UG/.5ML; UG/.5ML; UG/.5ML; UG/.5ML
0.5 SUSPENSION INTRAMUSCULAR ONCE
Refills: 0 | Status: COMPLETED | OUTPATIENT
Start: 2023-09-13 | End: 2023-09-13

## 2023-09-13 RX ORDER — DILTIAZEM HCL 120 MG
240 CAPSULE, EXT RELEASE 24 HR ORAL DAILY
Refills: 0 | Status: DISCONTINUED | OUTPATIENT
Start: 2023-09-13 | End: 2023-09-14

## 2023-09-13 RX ORDER — DILTIAZEM HCL 120 MG
240 CAPSULE, EXT RELEASE 24 HR ORAL
Qty: 0 | Refills: 0 | DISCHARGE

## 2023-09-13 RX ORDER — METOCLOPRAMIDE HCL 10 MG
10 TABLET ORAL ONCE
Refills: 0 | Status: DISCONTINUED | OUTPATIENT
Start: 2023-09-13 | End: 2023-09-14

## 2023-09-13 RX ORDER — OXYCODONE HYDROCHLORIDE 5 MG/1
5 TABLET ORAL EVERY 4 HOURS
Refills: 0 | Status: DISCONTINUED | OUTPATIENT
Start: 2023-09-13 | End: 2023-09-14

## 2023-09-13 RX ORDER — CEFAZOLIN SODIUM 1 G
1000 VIAL (EA) INJECTION EVERY 8 HOURS
Refills: 0 | Status: DISCONTINUED | OUTPATIENT
Start: 2023-09-14 | End: 2023-09-14

## 2023-09-13 RX ORDER — HYDROMORPHONE HYDROCHLORIDE 2 MG/ML
0.5 INJECTION INTRAMUSCULAR; INTRAVENOUS; SUBCUTANEOUS
Refills: 0 | Status: DISCONTINUED | OUTPATIENT
Start: 2023-09-13 | End: 2023-09-13

## 2023-09-13 RX ORDER — ONDANSETRON 8 MG/1
4 TABLET, FILM COATED ORAL EVERY 6 HOURS
Refills: 0 | Status: DISCONTINUED | OUTPATIENT
Start: 2023-09-13 | End: 2023-09-14

## 2023-09-13 RX ORDER — HEPARIN SODIUM 5000 [USP'U]/ML
5000 INJECTION INTRAVENOUS; SUBCUTANEOUS EVERY 8 HOURS
Refills: 0 | Status: DISCONTINUED | OUTPATIENT
Start: 2023-09-13 | End: 2023-09-14

## 2023-09-13 RX ORDER — OXYCODONE HYDROCHLORIDE 5 MG/1
5 TABLET ORAL ONCE
Refills: 0 | Status: DISCONTINUED | OUTPATIENT
Start: 2023-09-13 | End: 2023-09-13

## 2023-09-13 RX ORDER — KETOROLAC TROMETHAMINE 30 MG/ML
15 SYRINGE (ML) INJECTION EVERY 6 HOURS
Refills: 0 | Status: DISCONTINUED | OUTPATIENT
Start: 2023-09-13 | End: 2023-09-14

## 2023-09-13 RX ORDER — ACETAMINOPHEN 500 MG
975 TABLET ORAL EVERY 6 HOURS
Refills: 0 | Status: DISCONTINUED | OUTPATIENT
Start: 2023-09-13 | End: 2023-09-14

## 2023-09-13 RX ORDER — ONDANSETRON 8 MG/1
4 TABLET, FILM COATED ORAL ONCE
Refills: 0 | Status: COMPLETED | OUTPATIENT
Start: 2023-09-13 | End: 2023-09-13

## 2023-09-13 RX ORDER — SODIUM CHLORIDE 9 MG/ML
1000 INJECTION, SOLUTION INTRAVENOUS
Refills: 0 | Status: DISCONTINUED | OUTPATIENT
Start: 2023-09-13 | End: 2023-09-14

## 2023-09-13 RX ORDER — SODIUM CHLORIDE 9 MG/ML
1000 INJECTION, SOLUTION INTRAVENOUS
Refills: 0 | Status: DISCONTINUED | OUTPATIENT
Start: 2023-09-13 | End: 2023-09-13

## 2023-09-13 RX ORDER — CEFTRIAXONE 500 MG/1
1000 INJECTION, POWDER, FOR SOLUTION INTRAMUSCULAR; INTRAVENOUS ONCE
Refills: 0 | Status: COMPLETED | OUTPATIENT
Start: 2023-09-13 | End: 2023-09-13

## 2023-09-13 RX ADMIN — HYDROMORPHONE HYDROCHLORIDE 0.5 MILLIGRAM(S): 2 INJECTION INTRAMUSCULAR; INTRAVENOUS; SUBCUTANEOUS at 16:49

## 2023-09-13 RX ADMIN — CEFTRIAXONE 100 MILLIGRAM(S): 500 INJECTION, POWDER, FOR SOLUTION INTRAMUSCULAR; INTRAVENOUS at 14:45

## 2023-09-13 RX ADMIN — HYDROMORPHONE HYDROCHLORIDE 0.5 MILLIGRAM(S): 2 INJECTION INTRAMUSCULAR; INTRAVENOUS; SUBCUTANEOUS at 17:47

## 2023-09-13 RX ADMIN — ONDANSETRON 4 MILLIGRAM(S): 8 TABLET, FILM COATED ORAL at 19:05

## 2023-09-13 RX ADMIN — HEPARIN SODIUM 5000 UNIT(S): 5000 INJECTION INTRAVENOUS; SUBCUTANEOUS at 22:51

## 2023-09-13 RX ADMIN — Medication 975 MILLIGRAM(S): at 20:53

## 2023-09-13 RX ADMIN — HYDROMORPHONE HYDROCHLORIDE 0.5 MILLIGRAM(S): 2 INJECTION INTRAMUSCULAR; INTRAVENOUS; SUBCUTANEOUS at 21:23

## 2023-09-13 RX ADMIN — HYDROMORPHONE HYDROCHLORIDE 0.5 MILLIGRAM(S): 2 INJECTION INTRAMUSCULAR; INTRAVENOUS; SUBCUTANEOUS at 21:38

## 2023-09-13 RX ADMIN — ONDANSETRON 4 MILLIGRAM(S): 8 TABLET, FILM COATED ORAL at 21:28

## 2023-09-13 RX ADMIN — HYDROMORPHONE HYDROCHLORIDE 0.5 MILLIGRAM(S): 2 INJECTION INTRAMUSCULAR; INTRAVENOUS; SUBCUTANEOUS at 19:19

## 2023-09-13 RX ADMIN — HYDROMORPHONE HYDROCHLORIDE 0.5 MILLIGRAM(S): 2 INJECTION INTRAMUSCULAR; INTRAVENOUS; SUBCUTANEOUS at 19:30

## 2023-09-13 RX ADMIN — SODIUM CHLORIDE 100 MILLILITER(S): 9 INJECTION, SOLUTION INTRAVENOUS at 23:50

## 2023-09-13 RX ADMIN — Medication 975 MILLIGRAM(S): at 21:20

## 2023-09-13 NOTE — PRE PROCEDURE NOTE - PRE PROCEDURE EVALUATION
Interventional Radiology    HPI: 44 y/o male, PMH of HTN, HLD with c/o of right flank pain for 6 months. Was seen in the ER in sep 2022, CT scan showed right kidney stone. Seen by Dr Martínez, cat scan repeated, showing right hydronephrosis  ands ureteral stone. Pt had  right flexible ureteroscopy,  laser on 3/8/23 at [facility] but as per pt the stone could not be removed. Patient presents to IR for PCNU prior to PCNL 9/14.    Allergies: No Known Allergies    Medications (Abx/Cardiac/Anticoagulation/Blood Products)      Data:    T(C): --  HR: --  BP: --  RR: --  SpO2: --    Exam  General: No acute distress  Chest: Non labored breathing  Abdomen: Non-distended  Extremities: No swelling, warm          Imaging:     Plan:   44 y/o male, PMH of HTN, HLD with c/o of right flank pain for 6 months. Was seen in the ER in sep 2022, CT scan showed right kidney stone. Seen by Dr Martínez, cat scan repeated, showing right hydronephrosis  ands ureteral stone. Pt had  right flexible ureteroscopy,  laser on 3/8/23 at [facility] but as per pt the stone could not be removed. Patient presents to IR for PCNU prior to PCNL 9/14.    -- Relevant imaging and labs were reviewed.   -- Pre-procedural CTX given for antibiotics.  -- Risks, benefits, and alternatives were explained to the patient and informed consent was obtained.    Sameer Nguyễn M.D.  PGY4/R3, Interventional Radiology Resident    -Available on [employer] TEAMS for all non-urgent questions  -Emergent issues: [facility]-p.[phone]; [facility]-p.[phone] ([phone])  -Non-emergent consults: Please place a [place] order "Consult-Interventional Radiology" with an appropriate callback number  -Scheduling questions: [facility]: [phone]; [facility]: [phone]  -Clinic/Outpatient booking: [facility]: [phone]; [facility]: [phone]

## 2023-09-13 NOTE — PATIENT PROFILE ADULT - FALL HARM RISK - UNIVERSAL INTERVENTIONS
done
Bed in lowest position, wheels locked, appropriate side rails in place/Call bell, personal items and telephone in reach/Instruct patient to call for assistance before getting out of bed or chair/Non-slip footwear when patient is out of bed/[place] to call system/Physically safe environment - no spills, clutter or unnecessary equipment/Purposeful Proactive Rounding/Room/bathroom lighting operational, light cord in reach

## 2023-09-14 ENCOUNTER — TRANSCRIPTION ENCOUNTER (OUTPATIENT)
Age: 43
End: 2023-09-14

## 2023-09-14 VITALS
DIASTOLIC BLOOD PRESSURE: 98 MMHG | RESPIRATION RATE: 18 BRPM | OXYGEN SATURATION: 99 % | HEART RATE: 90 BPM | SYSTOLIC BLOOD PRESSURE: 147 MMHG | TEMPERATURE: 98 F

## 2023-09-14 DIAGNOSIS — N20.0 CALCULUS OF KIDNEY: ICD-10-CM

## 2023-09-14 LAB
ANION GAP SERPL CALC-SCNC: 13 MMOL/L — SIGNIFICANT CHANGE UP (ref 7–14)
BLD GP AB SCN SERPL QL: NEGATIVE — SIGNIFICANT CHANGE UP
BUN SERPL-MCNC: 12 MG/DL — SIGNIFICANT CHANGE UP (ref 7–23)
CALCIUM SERPL-MCNC: 9.3 MG/DL — SIGNIFICANT CHANGE UP (ref 8.4–10.5)
CHLORIDE SERPL-SCNC: 99 MMOL/L — SIGNIFICANT CHANGE UP (ref 98–107)
CO2 SERPL-SCNC: 26 MMOL/L — SIGNIFICANT CHANGE UP (ref 22–31)
CREAT SERPL-MCNC: 0.91 MG/DL — SIGNIFICANT CHANGE UP (ref 0.5–1.3)
EGFR: 109 ML/MIN/1.73M2 — SIGNIFICANT CHANGE UP
GLUCOSE SERPL-MCNC: 105 MG/DL — HIGH (ref 70–99)
HCT VFR BLD CALC: 40.5 % — SIGNIFICANT CHANGE UP (ref 39–50)
HGB BLD-MCNC: 14.5 G/DL — SIGNIFICANT CHANGE UP (ref 13–17)
MCHC RBC-ENTMCNC: 29.7 PG — SIGNIFICANT CHANGE UP (ref 27–34)
MCHC RBC-ENTMCNC: 35.8 GM/DL — SIGNIFICANT CHANGE UP (ref 32–36)
MCV RBC AUTO: 83 FL — SIGNIFICANT CHANGE UP (ref 80–100)
NRBC # BLD: 0 /100 WBCS — SIGNIFICANT CHANGE UP (ref 0–0)
NRBC # FLD: 0 K/UL — SIGNIFICANT CHANGE UP (ref 0–0)
PLATELET # BLD AUTO: 205 K/UL — SIGNIFICANT CHANGE UP (ref 150–400)
POTASSIUM SERPL-MCNC: 3.9 MMOL/L — SIGNIFICANT CHANGE UP (ref 3.5–5.3)
POTASSIUM SERPL-SCNC: 3.9 MMOL/L — SIGNIFICANT CHANGE UP (ref 3.5–5.3)
RBC # BLD: 4.88 M/UL — SIGNIFICANT CHANGE UP (ref 4.2–5.8)
RBC # FLD: 12.1 % — SIGNIFICANT CHANGE UP (ref 10.3–14.5)
RH IG SCN BLD-IMP: POSITIVE — SIGNIFICANT CHANGE UP
SODIUM SERPL-SCNC: 138 MMOL/L — SIGNIFICANT CHANGE UP (ref 135–145)
WBC # BLD: 10.02 K/UL — SIGNIFICANT CHANGE UP (ref 3.8–10.5)
WBC # FLD AUTO: 10.02 K/UL — SIGNIFICANT CHANGE UP (ref 3.8–10.5)

## 2023-09-14 RX ORDER — FENTANYL CITRATE 50 UG/ML
25 INJECTION INTRAVENOUS
Refills: 0 | Status: DISCONTINUED | OUTPATIENT
Start: 2023-09-14 | End: 2023-09-14

## 2023-09-14 RX ORDER — FENTANYL CITRATE 50 UG/ML
50 INJECTION INTRAVENOUS ONCE
Refills: 0 | Status: DISCONTINUED | OUTPATIENT
Start: 2023-09-14 | End: 2023-09-14

## 2023-09-14 RX ORDER — OXYCODONE HYDROCHLORIDE 5 MG/1
1 TABLET ORAL
Qty: 12 | Refills: 0
Start: 2023-09-14 | End: 2023-09-16

## 2023-09-14 RX ORDER — ONDANSETRON 8 MG/1
4 TABLET, FILM COATED ORAL ONCE
Refills: 0 | Status: DISCONTINUED | OUTPATIENT
Start: 2023-09-14 | End: 2023-09-14

## 2023-09-14 RX ADMIN — OXYCODONE HYDROCHLORIDE 5 MILLIGRAM(S): 5 TABLET ORAL at 05:39

## 2023-09-14 RX ADMIN — OXYCODONE HYDROCHLORIDE 5 MILLIGRAM(S): 5 TABLET ORAL at 06:30

## 2023-09-14 RX ADMIN — HEPARIN SODIUM 5000 UNIT(S): 5000 INJECTION INTRAVENOUS; SUBCUTANEOUS at 05:39

## 2023-09-14 RX ADMIN — Medication 240 MILLIGRAM(S): at 05:40

## 2023-09-14 NOTE — DISCHARGE NOTE PROVIDER - NSDCCPCAREPLAN_GEN_ALL_CORE_FT
PRINCIPAL DISCHARGE DIAGNOSIS  Diagnosis: Kidney stones  Assessment and Plan of Treatment: Keep well hydrated.  No heavy lifting or straining for 2 to 4 weeks.  You may have some leakage from your back for the next few days, change bandage daily as needed.  You may have intermittent blood tinged urine and slight flank pain when you urinate.  This is normal and due to the stent in your ureter.   If your urine becomes bright red or with clots, please call the office.  You have a ureteral stent to help with tissue healing and drainage of the kidney. The stent is temporary, and must be eventually removed or it may cause problems with infection and kidney damage if left in place greater than 3 months.  Call Dr. Duenas's office to schedule a follow up appointment for stent removal and further management.  Call the office if you have fever greater than 101, difficulty urinating, pain not relieved with pain medication, nausea/vomiting.

## 2023-09-14 NOTE — DISCHARGE NOTE PROVIDER - HOSPITAL COURSE
42 yo M s/p IR placement of right N-U 9/13/2023 admitted secondary to N/V, underwent right PCNL and removal of N-U tube, ureteral stent placement 9/14. Postoperative course uneventful, voiding without difficulty, urine remained acceptable in color.  Pain controlled, tolerating diet, ambulating without difficulty .  Pt d/c to f/u with Dr. Duenas.  Alan checked.

## 2023-09-14 NOTE — DISCHARGE NOTE NURSING/CASE MANAGEMENT/SOCIAL WORK - PATIENT PORTAL LINK FT
You can access the FollowMyHealth Patient Portal offered by [facility] by registering at the following website: [email or web address]. By joining [employer]’s FollowMyHealth portal, you will also be able to view your health information using other applications (apps) compatible with our system.

## 2023-09-14 NOTE — BRIEF OPERATIVE NOTE - ESTIMATED BLOOD LOSS
0
Nasal mucosa clear.  Mouth with normal mucosa  Throat has no vesicles, no oropharyngeal exudates and uvula is midline.

## 2023-09-14 NOTE — PRE-OP CHECKLIST - BMI (KG/M2)
Impression: Presbyopia: H52.4. Plan: [employer] Rx dispensed adjustment expected. Discussed change in Rx with patient. Printed copy of Rx given to patient today. 26.6

## 2023-09-14 NOTE — DISCHARGE NOTE PROVIDER - NSDCMRMEDTOKEN_GEN_ALL_CORE_FT
B complex 1 tab daily:   Cardizem: 240 milligram(s) orally once a day  oxyCODONE 5 mg oral tablet: 1 tab(s) orally every 6 hours MDD: 4  sulfamethoxazole-trimethoprim 800 mg-160 mg oral tablet: 1 tab(s) orally 2 times a day

## 2023-09-14 NOTE — DISCHARGE NOTE PROVIDER - CARE PROVIDER_API CALL
Darren Duenas  Urology  [address], [address]  [place] [address]  Phone: [phone]  Fax: [phone]  Follow Up Time:

## 2023-09-14 NOTE — ASU DISCHARGE PLAN (ADULT/PEDIATRIC) - CARE PROVIDER_API CALL
Darren Duenas  Urology  [address], [address]  [place] [address]  Phone: [phone]  Fax: [phone]  Follow Up Time: Routine

## 2023-09-14 NOTE — ASU DISCHARGE PLAN (ADULT/PEDIATRIC) - NS MD DC FALL RISK RISK
For information on Fall & Injury Prevention, visit: [email or web address] OR  [email or web address] OR  https://www.cdc.gov/steadi/patient.html

## 2023-09-14 NOTE — DISCHARGE NOTE NURSING/CASE MANAGEMENT/SOCIAL WORK - NSDCPNINST_GEN_ALL_CORE
Notify Dr Duenas if you experience any increase in pain not relieved with medication, any persistent nausea vomiting, any dark red blood or clots in urine, if you are unable to urinate or if you develop a fever >1.5.  Drink plenty of fluids. No heavy lifting or straining.  Use over the counter stool softeners to assist with constipation.

## 2023-09-14 NOTE — PROGRESS NOTE ADULT - ASSESSMENT
44 yo M s/p IR placement of right N-U 9/13/2023 admitted secondary to N/V, for PCNL today    Plan:  -NPO  -IVF  -Ancef  -consent to be completed by Dr. Duenas   Attempted to call pt,Left message on voicemail that labs were ordered and can be scheduled with phone staff

## 2023-09-14 NOTE — PROGRESS NOTE ADULT - SUBJECTIVE AND OBJECTIVE BOX
ANESTHESIA POSTOP CHECK          NO APPARENT ANESTHESIA COMPLICATIONS
-------------------------------------------------------------  Interventional Radiology Post-operative Check  -------------------------------------------------------------    Procedure: PCNU    44 yo M s/p POD1 s/p IR placement of right PCNU admitted secondary to N/V, for PCNL today. No acute events overnight. Patient pain better controlled. Denies fever/chills. No major complaints this AM.    Vital Signs:    T(C): 36.6 (09-14-23 @ 05:40), Max: 36.9 (09-13-23 @ 23:30)  HR: 83 (09-14-23 @ 05:40) (68 - 84)  BP: 148/84 (09-14-23 @ 05:40) (127/88 - 148/84)  RR: 16 (09-14-23 @ 05:40) (11 - 18)  SpO2: 97% (09-14-23 @ 05:40) (97% - 100%)    Limited Physical Exam:    General: No acute distress  Chest: Non labored breathing  Abdomen: Soft, NT, ND. R Flank incision CDI. Right PCNU catheter bag w/ blood tinged urine.  Extremities: No swelling, warm    A/P:  -- IR to follow  -- Monitor output q8  -- For PCNL today. Care per urology team.  -- Please reach out with any questions or concerns.     Sameer Nguyễn M.D.  PGY4/R3, Interventional Radiology Resident    -Available on [employer] TEAMS for all non-urgent questions  -Emergent issues: [phone]; [id number] ([phone])  -Non-emergent consults: Please place a [place] order "Consult-Interventional Radiology" with an appropriate callback number  -Scheduling questions: [facility]: [phone]; [facility]: [phone]  -Clinic/Outpatient booking: [facility]: [phone]; [facility]: [phone]
Overnight events:  None    Subjective:  Pt feels better, N/V resolved    Objective:    Vital signs  T(C): , Max: 36.9 (09-13-23 @ 23:30)  HR: 83 (09-14-23 @ 05:40)  BP: 148/84 (09-14-23 @ 05:40)  SpO2: 97% (09-14-23 @ 05:40)  Wt(kg): --    Output   NT: 825 punch  Void: NR  09-13 @ 07:01  -  09-14 @ 07:00  --------------------------------------------------------  IN: 150 mL / OUT: 825 mL / NET: -675 mL        Gen: NAD  Abd: soft, nontender  R NT dressing c/d/i      Labs: pending

## 2023-11-14 ENCOUNTER — EMERGENCY (EMERGENCY)
Facility: HOSPITAL | Age: 43
LOS: 1 days | Discharge: HOME | End: 2023-11-14
Attending: EMERGENCY MEDICINE | Admitting: EMERGENCY MEDICINE
Payer: COMMERCIAL

## 2023-11-14 VITALS
DIASTOLIC BLOOD PRESSURE: 84 MMHG | TEMPERATURE: 97 F | HEIGHT: 70 IN | OXYGEN SATURATION: 97 % | RESPIRATION RATE: 18 BRPM | WEIGHT: 185.19 LBS | SYSTOLIC BLOOD PRESSURE: 97 MMHG | HEART RATE: 230 BPM

## 2023-11-14 VITALS
RESPIRATION RATE: 20 BRPM | HEART RATE: 96 BPM | OXYGEN SATURATION: 98 % | SYSTOLIC BLOOD PRESSURE: 120 MMHG | DIASTOLIC BLOOD PRESSURE: 83 MMHG

## 2023-11-14 DIAGNOSIS — Z98.890 OTHER SPECIFIED POSTPROCEDURAL STATES: Chronic | ICD-10-CM

## 2023-11-14 LAB
ALBUMIN SERPL ELPH-MCNC: 4.2 G/DL — SIGNIFICANT CHANGE UP (ref 3.3–5)
ALBUMIN SERPL ELPH-MCNC: 4.2 G/DL — SIGNIFICANT CHANGE UP (ref 3.3–5)
ALP SERPL-CCNC: 89 U/L — SIGNIFICANT CHANGE UP (ref 40–120)
ALP SERPL-CCNC: 89 U/L — SIGNIFICANT CHANGE UP (ref 40–120)
ALT FLD-CCNC: 50 U/L — SIGNIFICANT CHANGE UP (ref 12–78)
ALT FLD-CCNC: 50 U/L — SIGNIFICANT CHANGE UP (ref 12–78)
ANION GAP SERPL CALC-SCNC: 7 MMOL/L — SIGNIFICANT CHANGE UP (ref 5–17)
ANION GAP SERPL CALC-SCNC: 7 MMOL/L — SIGNIFICANT CHANGE UP (ref 5–17)
AST SERPL-CCNC: 35 U/L — SIGNIFICANT CHANGE UP (ref 15–37)
AST SERPL-CCNC: 35 U/L — SIGNIFICANT CHANGE UP (ref 15–37)
BASOPHILS # BLD AUTO: 0.05 K/UL — SIGNIFICANT CHANGE UP (ref 0–0.2)
BASOPHILS # BLD AUTO: 0.05 K/UL — SIGNIFICANT CHANGE UP (ref 0–0.2)
BASOPHILS NFR BLD AUTO: 0.5 % — SIGNIFICANT CHANGE UP (ref 0–2)
BASOPHILS NFR BLD AUTO: 0.5 % — SIGNIFICANT CHANGE UP (ref 0–2)
BILIRUB SERPL-MCNC: 0.5 MG/DL — SIGNIFICANT CHANGE UP (ref 0.2–1.2)
BILIRUB SERPL-MCNC: 0.5 MG/DL — SIGNIFICANT CHANGE UP (ref 0.2–1.2)
BUN SERPL-MCNC: 11 MG/DL — SIGNIFICANT CHANGE UP (ref 7–23)
BUN SERPL-MCNC: 11 MG/DL — SIGNIFICANT CHANGE UP (ref 7–23)
CALCIUM SERPL-MCNC: 9.1 MG/DL — SIGNIFICANT CHANGE UP (ref 8.5–10.1)
CALCIUM SERPL-MCNC: 9.1 MG/DL — SIGNIFICANT CHANGE UP (ref 8.5–10.1)
CHLORIDE SERPL-SCNC: 106 MMOL/L — SIGNIFICANT CHANGE UP (ref 96–108)
CHLORIDE SERPL-SCNC: 106 MMOL/L — SIGNIFICANT CHANGE UP (ref 96–108)
CO2 SERPL-SCNC: 29 MMOL/L — SIGNIFICANT CHANGE UP (ref 22–31)
CO2 SERPL-SCNC: 29 MMOL/L — SIGNIFICANT CHANGE UP (ref 22–31)
CREAT SERPL-MCNC: 1.1 MG/DL — SIGNIFICANT CHANGE UP (ref 0.5–1.3)
CREAT SERPL-MCNC: 1.1 MG/DL — SIGNIFICANT CHANGE UP (ref 0.5–1.3)
EGFR: 86 ML/MIN/1.73M2 — SIGNIFICANT CHANGE UP
EGFR: 86 ML/MIN/1.73M2 — SIGNIFICANT CHANGE UP
EOSINOPHIL # BLD AUTO: 0.21 K/UL — SIGNIFICANT CHANGE UP (ref 0–0.5)
EOSINOPHIL # BLD AUTO: 0.21 K/UL — SIGNIFICANT CHANGE UP (ref 0–0.5)
EOSINOPHIL NFR BLD AUTO: 2.1 % — SIGNIFICANT CHANGE UP (ref 0–6)
EOSINOPHIL NFR BLD AUTO: 2.1 % — SIGNIFICANT CHANGE UP (ref 0–6)
GLUCOSE SERPL-MCNC: 187 MG/DL — HIGH (ref 70–99)
GLUCOSE SERPL-MCNC: 187 MG/DL — HIGH (ref 70–99)
HCT VFR BLD CALC: 45.2 % — SIGNIFICANT CHANGE UP (ref 39–50)
HCT VFR BLD CALC: 45.2 % — SIGNIFICANT CHANGE UP (ref 39–50)
HGB BLD-MCNC: 15.9 G/DL — SIGNIFICANT CHANGE UP (ref 13–17)
HGB BLD-MCNC: 15.9 G/DL — SIGNIFICANT CHANGE UP (ref 13–17)
IMM GRANULOCYTES NFR BLD AUTO: 0.4 % — SIGNIFICANT CHANGE UP (ref 0–0.9)
IMM GRANULOCYTES NFR BLD AUTO: 0.4 % — SIGNIFICANT CHANGE UP (ref 0–0.9)
INR BLD: 0.94 RATIO — SIGNIFICANT CHANGE UP (ref 0.85–1.18)
INR BLD: 0.94 RATIO — SIGNIFICANT CHANGE UP (ref 0.85–1.18)
LYMPHOCYTES # BLD AUTO: 3.51 K/UL — HIGH (ref 1–3.3)
LYMPHOCYTES # BLD AUTO: 3.51 K/UL — HIGH (ref 1–3.3)
LYMPHOCYTES # BLD AUTO: 34.5 % — SIGNIFICANT CHANGE UP (ref 13–44)
LYMPHOCYTES # BLD AUTO: 34.5 % — SIGNIFICANT CHANGE UP (ref 13–44)
MAGNESIUM SERPL-MCNC: 2.1 MG/DL — SIGNIFICANT CHANGE UP (ref 1.6–2.6)
MAGNESIUM SERPL-MCNC: 2.1 MG/DL — SIGNIFICANT CHANGE UP (ref 1.6–2.6)
MCHC RBC-ENTMCNC: 29.8 PG — SIGNIFICANT CHANGE UP (ref 27–34)
MCHC RBC-ENTMCNC: 29.8 PG — SIGNIFICANT CHANGE UP (ref 27–34)
MCHC RBC-ENTMCNC: 35.2 GM/DL — SIGNIFICANT CHANGE UP (ref 32–36)
MCHC RBC-ENTMCNC: 35.2 GM/DL — SIGNIFICANT CHANGE UP (ref 32–36)
MCV RBC AUTO: 84.6 FL — SIGNIFICANT CHANGE UP (ref 80–100)
MCV RBC AUTO: 84.6 FL — SIGNIFICANT CHANGE UP (ref 80–100)
MONOCYTES # BLD AUTO: 0.57 K/UL — SIGNIFICANT CHANGE UP (ref 0–0.9)
MONOCYTES # BLD AUTO: 0.57 K/UL — SIGNIFICANT CHANGE UP (ref 0–0.9)
MONOCYTES NFR BLD AUTO: 5.6 % — SIGNIFICANT CHANGE UP (ref 2–14)
MONOCYTES NFR BLD AUTO: 5.6 % — SIGNIFICANT CHANGE UP (ref 2–14)
NEUTROPHILS # BLD AUTO: 5.79 K/UL — SIGNIFICANT CHANGE UP (ref 1.8–7.4)
NEUTROPHILS # BLD AUTO: 5.79 K/UL — SIGNIFICANT CHANGE UP (ref 1.8–7.4)
NEUTROPHILS NFR BLD AUTO: 56.9 % — SIGNIFICANT CHANGE UP (ref 43–77)
NEUTROPHILS NFR BLD AUTO: 56.9 % — SIGNIFICANT CHANGE UP (ref 43–77)
NRBC # BLD: 0 /100 WBCS — SIGNIFICANT CHANGE UP (ref 0–0)
NRBC # BLD: 0 /100 WBCS — SIGNIFICANT CHANGE UP (ref 0–0)
PLATELET # BLD AUTO: 250 K/UL — SIGNIFICANT CHANGE UP (ref 150–400)
PLATELET # BLD AUTO: 250 K/UL — SIGNIFICANT CHANGE UP (ref 150–400)
POTASSIUM SERPL-MCNC: 3.5 MMOL/L — SIGNIFICANT CHANGE UP (ref 3.5–5.3)
POTASSIUM SERPL-MCNC: 3.5 MMOL/L — SIGNIFICANT CHANGE UP (ref 3.5–5.3)
POTASSIUM SERPL-SCNC: 3.5 MMOL/L — SIGNIFICANT CHANGE UP (ref 3.5–5.3)
POTASSIUM SERPL-SCNC: 3.5 MMOL/L — SIGNIFICANT CHANGE UP (ref 3.5–5.3)
PROT SERPL-MCNC: 7.7 G/DL — SIGNIFICANT CHANGE UP (ref 6–8.3)
PROT SERPL-MCNC: 7.7 G/DL — SIGNIFICANT CHANGE UP (ref 6–8.3)
PROTHROM AB SERPL-ACNC: 11 SEC — SIGNIFICANT CHANGE UP (ref 9.5–13)
PROTHROM AB SERPL-ACNC: 11 SEC — SIGNIFICANT CHANGE UP (ref 9.5–13)
RBC # BLD: 5.34 M/UL — SIGNIFICANT CHANGE UP (ref 4.2–5.8)
RBC # BLD: 5.34 M/UL — SIGNIFICANT CHANGE UP (ref 4.2–5.8)
RBC # FLD: 12.3 % — SIGNIFICANT CHANGE UP (ref 10.3–14.5)
RBC # FLD: 12.3 % — SIGNIFICANT CHANGE UP (ref 10.3–14.5)
SODIUM SERPL-SCNC: 142 MMOL/L — SIGNIFICANT CHANGE UP (ref 135–145)
SODIUM SERPL-SCNC: 142 MMOL/L — SIGNIFICANT CHANGE UP (ref 135–145)
TROPONIN I, HIGH SENSITIVITY RESULT: 16.7 NG/L — SIGNIFICANT CHANGE UP
TROPONIN I, HIGH SENSITIVITY RESULT: 16.7 NG/L — SIGNIFICANT CHANGE UP
TSH SERPL-MCNC: 1.62 UIU/ML — SIGNIFICANT CHANGE UP (ref 0.36–3.74)
TSH SERPL-MCNC: 1.62 UIU/ML — SIGNIFICANT CHANGE UP (ref 0.36–3.74)
WBC # BLD: 10.17 K/UL — SIGNIFICANT CHANGE UP (ref 3.8–10.5)
WBC # BLD: 10.17 K/UL — SIGNIFICANT CHANGE UP (ref 3.8–10.5)
WBC # FLD AUTO: 10.17 K/UL — SIGNIFICANT CHANGE UP (ref 3.8–10.5)
WBC # FLD AUTO: 10.17 K/UL — SIGNIFICANT CHANGE UP (ref 3.8–10.5)

## 2023-11-14 PROCEDURE — 96374 THER/PROPH/DIAG INJ IV PUSH: CPT

## 2023-11-14 PROCEDURE — 84443 ASSAY THYROID STIM HORMONE: CPT

## 2023-11-14 PROCEDURE — 84484 ASSAY OF TROPONIN QUANT: CPT

## 2023-11-14 PROCEDURE — 99285 EMERGENCY DEPT VISIT HI MDM: CPT | Mod: 25

## 2023-11-14 PROCEDURE — 71045 X-RAY EXAM CHEST 1 VIEW: CPT | Mod: 26

## 2023-11-14 PROCEDURE — 99285 EMERGENCY DEPT VISIT HI MDM: CPT

## 2023-11-14 PROCEDURE — 85025 COMPLETE CBC W/AUTO DIFF WBC: CPT

## 2023-11-14 PROCEDURE — 93010 ELECTROCARDIOGRAM REPORT: CPT

## 2023-11-14 PROCEDURE — 80053 COMPREHEN METABOLIC PANEL: CPT

## 2023-11-14 PROCEDURE — 84436 ASSAY OF TOTAL THYROXINE: CPT

## 2023-11-14 PROCEDURE — 83735 ASSAY OF MAGNESIUM: CPT

## 2023-11-14 PROCEDURE — 93005 ELECTROCARDIOGRAM TRACING: CPT

## 2023-11-14 PROCEDURE — 71045 X-RAY EXAM CHEST 1 VIEW: CPT

## 2023-11-14 PROCEDURE — 36415 COLL VENOUS BLD VENIPUNCTURE: CPT

## 2023-11-14 PROCEDURE — 85610 PROTHROMBIN TIME: CPT

## 2023-11-14 RX ORDER — POTASSIUM CHLORIDE 20 MEQ
40 PACKET (EA) ORAL ONCE
Refills: 0 | Status: COMPLETED | OUTPATIENT
Start: 2023-11-14 | End: 2023-11-14

## 2023-11-14 RX ORDER — SODIUM CHLORIDE 9 MG/ML
1000 INJECTION INTRAMUSCULAR; INTRAVENOUS; SUBCUTANEOUS ONCE
Refills: 0 | Status: COMPLETED | OUTPATIENT
Start: 2023-11-14 | End: 2023-11-14

## 2023-11-14 RX ORDER — ADENOSINE 3 MG/ML
6 INJECTION INTRAVENOUS ONCE
Refills: 0 | Status: COMPLETED | OUTPATIENT
Start: 2023-11-14 | End: 2023-11-14

## 2023-11-14 RX ADMIN — Medication 40 MILLIEQUIVALENT(S): at 17:31

## 2023-11-14 RX ADMIN — SODIUM CHLORIDE 2000 MILLILITER(S): 9 INJECTION INTRAMUSCULAR; INTRAVENOUS; SUBCUTANEOUS at 15:53

## 2023-11-14 RX ADMIN — ADENOSINE 6 MILLIGRAM(S): 3 INJECTION INTRAVENOUS at 15:53

## 2023-11-14 NOTE — CONSULT NOTE ADULT - NS ATTEND AMEND GEN_ALL_CORE FT
42 y/o male, PMH of HTN, HLD, right kidney stone, right hydronephrosis and ureteral stone recent cytoscopy right ureteral catheter placement, right percutaneous nephrolithotomy with tract dilatation and stent insertion. on 9/14/23 came into the ED c/o being in SVT with palpitations x 30 min PTA. Used to see a cardiologist in [place], but recently moved to [place].    longstanding hx of svt, responsive to dilt  skipped dilt and sig stress and had sustained svt today  took dilt x 2 doses, yet still with resting st today  check tfts  cont dilt  outpt followup  discussed possible ablation, which can be discussed again as outpt

## 2023-11-14 NOTE — CONSULT NOTE ADULT - ASSESSMENT
42 y/o male, PMH of HTN, HLD, right kidney stone, right hydronephrosis and ureteral stone recent cstoscopy, right ureteral catheter placement, right percutaneous nephrolithotomy with tract dilatation and stent insertion. on 9/14/23 came into the ED c/o palpitations x 30 min PTA, found to be in SVT 220s.     SVT, HTN, HLD  - Patient has h/o SVT x 30 years, takes Cardizem 240mg daily but hasn't taken it the last 2 days secondary to working and being distracted.  - Initial EKG SVT @ 226. S/p adenosine 6 mg IV x1 and NS bolus 1 L IV x 1.       - Monitor and replete lytes, keep K>4, Mg>2.  - Will continue to follow.    Arleen Newman MS FNP, AGACNP  Nurse Practitioner- Cardiology   Please call on TEAMS     44 y/o male, PMH of HTN, HLD, right kidney stone, right hydronephrosis and ureteral stone recent cytoscopy right ureteral catheter placement, right percutaneous nephrolithotomy with tract dilatation and stent insertion. on 9/14/23 came into the ED c/o being in SVT with palpitations x 30 min PTA. Used to see a cardiologist in [place], but recently moved to [place].    SVT, HTN, HLD  - Patient has h/o SVT x 30 years, takes Cardizem 240mg daily but hasn't taken it the last 2 days secondary to working and being distracted.  - Initial EKG SVT @ 226. S/p adenosine 6 mg IV x1 and NS bolus 1 L IV x 1.   - Hr now in SR 110s. Rpt EKG with ST @ 116.   - Continue Cardizem 240 mg PO daily. GIve a dose now.   - No evidence of any active ischemia   - No evidence of any meaningful volume overload  - BP stable and controlled   - Patient ok for discharge based on cardiovascular parameters, can follow up with Dr Turner outpatient     - Monitor and replete lytes, keep K>4, Mg>2.  - Will continue to follow.    Arleen Newman, MS FNP, AGACNP  Nurse Practitioner- Cardiology   Please call on TEAMS

## 2023-11-14 NOTE — ED ADULT NURSE NOTE - OBJECTIVE STATEMENT
Patient received complaining of high HR (>200), states h/x of SVT, states has not taken his medication today. Patient is AOx4, in no acute distress at this time, intervention implemented, safety precautions in place, awaiting evaluation

## 2023-11-14 NOTE — ED PROVIDER NOTE - NSFOLLOWUPINSTRUCTIONS_ED_ALL_ED_FT
Supraventricular Tachycardia, Adult  Supraventricular tachycardia (SVT) is a type of abnormal heart rhythm. It causes the heart to beat very quickly. SVT can start suddenly and last for a short time, which is called paroxysmal SVT, or it may last longer and require specialized treatment to return the heart rhythm to normal.    A normal resting heart rate is 60–100 beats per minute. During an episode of SVT, your heart rate may be higher than 150 beats per minute. Episodes of SVT can be frightening, but they are usually not dangerous. However, if episodes happen several times a day or last longer than a few seconds, they may lead to heart failure.    What are the causes?    Usually, a normal heartbeat starts when an area called the sinoatrial node releases an electrical signal. In SVT, other areas of the heart send out electrical signals that interfere with the signal from the sinoatrial node. The cause of this abnormal electrical activity is not known.    What increases the risk?  You are more likely to develop this condition if you are:  Middle aged or younger.  Female.  The following factors may also make you more likely to develop this condition:  Stress or anxiety.  Tiredness.  Smoking.  Stimulant drugs, such as cocaine and methamphetamine.  Alcohol.  Caffeine.  Pregnancy.  Having any of these conditions:  A thyroid condition.  Diabetes mellitus.  Obstructive sleep apnea.  What are the signs or symptoms?  Symptoms of this condition include:  A pounding heart.  A feeling that the heart is skipping beats (palpitations).  Weakness.  Shortness of breath.  Tightness or pain in your chest.  Light-headedness or dizziness.  Anxiety.  Sweating.  Nausea.  Fainting.  Fatigue or tiredness.  A mild episode may not cause symptoms.    How is this diagnosed?  This condition may be diagnosed based on:  Your symptoms.  A physical exam. If you have an episode of SVT during the exam, the health care provider may be able to diagnose SVT by listening to your heart and feeling your pulse.  Tests. These may include:  An electrocardiogram (ECG). This test is done to check for problems with electrical activity in the heart.  A Holter monitor or event monitor test. This test involves wearing a portable device that monitors your heart rate over time.  An echocardiogram. This test involves taking an image of your heart using sound waves. It is done to rule out other causes of a fast heart rate.  A stress echocardiogram. This test involves doing an echocardiogram when you are at rest and after exercise.  Blood tests.  An electrophysiology study (EPS). This tests the electrical activity in your heart to find where the abnormal heart rhythm is coming from using cardiac catheters.  How is this treated?  This condition may be treated with:  Vagal nerve stimulation. This involves stimulating your vagus nerve, which is a nerve that runs from the chest, through the neck, to the lower part of the brain. Stimulating this nerve can slow down the heart. It is often the first and only treatment that is needed for this condition. Work with your health care provider to find which technique works best for you. Ways to do this treatment include:  Laying on your back, then holding your breath and pushing, as though you are having a bowel movement.  Massaging an area on one side of your neck, below your jaw. Do not try this yourself. Only a health care provider should do this. If done the wrong way, it can lead to a stroke.  Bending forward with your head between your legs.  Coughing while bending forward with your head between your legs.  Applying an ice-cold, wet towel to your face.  Medicines that prevent attacks.  Medicine to stop an attack. The medicine is given through an IV at the hospital.  A small electric shock (cardioversion) that stops an attack. Before you get the shock, you will get medicine to make you fall asleep.  Radiofrequency ablation. In this procedure, a small, thin tube (catheter) is used to send radiofrequency energy to the area of tissue that is causing the rapid heartbeats. The energy kills the cells and helps your heart keep a normal rhythm. You may have this treatment if you have symptoms of SVT often.  If you do not have symptoms, you may not need treatment.    Follow these instructions at home:  Stress    Avoid stressful situations when possible.  Find healthy ways of managing stress, such as:  Taking part in relaxing activities, such as yoga, meditation, or being out in nature.  Listening to relaxing music.  Practicing relaxation techniques, such as deep breathing.  Leading a healthy lifestyle. This involves getting plenty of sleep, exercising, and eating a balanced diet.  Attending counseling or talk therapy with a mental health professional.  Lifestyle      Try to get at least 7 hours of sleep each night.  Do not use any products that contain nicotine or tobacco. These products include cigarettes, chewing tobacco, and vaping devices, such as e-cigarettes. If you need help quitting, ask your health care provider.  Do not drink alcohol if it triggers episodes of SVT.  If alcohol does not seem to trigger episodes, limit your alcohol intake. If you drink alcohol:  Limit how much you have to:  0–1 drink a day for women who are not pregnant.  0–2 drinks a day for men.  Know how much alcohol is in your drink. In the [place] one drink equals one 12 oz bottle of beer (355 mL), one 5 oz glass of wine (148 mL), or one 1½ oz glass of hard liquor (44 mL).  Be aware of how caffeine affects your condition. If caffeine:  Triggers episodes of SVT, do not eat, drink, or use anything with caffeine in it.  Does not seem to trigger episodes, consume caffeine in moderation.  Do not use stimulant drugs. If you need help quitting, talk with your health care provider.  General instructions    Maintain a healthy weight.  Exercise regularly. Ask your health care provider to suggest some good activities for you. Aim for one or a combination of the followin minutes per week of moderate exercise, such as walking or yoga.  75 minutes per week of vigorous exercise, such as running or swimming.  Perform vagus nerve stimulation as directed by your health care provider.  Take over-the-counter and prescription medicines only as told by your health care provider.  Keep all follow-up visits. This is important.  Contact a health care provider if:  You have episodes of SVT more often than before.  Episodes of SVT last longer than before.  Vagus nerve stimulation is no longer helping.  You have new symptoms.  Get help right away if:  You have chest pain.  Your symptoms get worse.  You have trouble breathing.  You have an episode of SVT that lasts longer than 20 minutes.  You faint.  These symptoms may represent a serious problem that is an emergency. Do not wait to see if the symptoms will go away. Get medical help right away. Call your local emergency services (911 in the [place] Do not drive yourself to the hospital.    Summary  Supraventricular tachycardia (SVT) is a type of abnormal heart rhythm.  During an episode of SVT, your heart rate may be higher than 150 beats per minute.  If you do not have symptoms, you may not need treatment.  This information is not intended to replace advice given to you by your health care provider. Make sure you discuss any questions you have with your health care provider.    **Follow up with Dr. Turner for further evaluation of your arrhythmia. Continue cardizem 240mg daily. Do not skip doses. Return for worsening symptoms, any concerns.

## 2023-11-14 NOTE — ED PROVIDER NOTE - PROGRESS NOTE DETAILS
results reviewed with patient and family at bedside. patient seen by Dr. Turner (cardio) and discussed possible ablation. to see him as outpatient. again encouraged to take cardizem without skipping doses. d/c home. f/u outpatient.

## 2023-11-14 NOTE — ED ADULT TRIAGE NOTE - CHIEF COMPLAINT QUOTE
"My heart rate is over 200" pt states he has a history of SVT in the past. Pt noted to have Hr of 230 BPM in the ED.

## 2023-11-14 NOTE — ED PROVIDER NOTE - OBJECTIVE STATEMENT
Patient came into the ED c/o being in SVT with palpitations x 30 min PTA. patient states h/o SVT x 30 years. takes cardizem 240mg daily but hasn't taken it the last 2 days secondary to working and being distracted. Patient came into the ED c/o being in SVT with palpitations x 30 min PTA. patient states h/o SVT x 30 years. takes cardizem 240mg daily but hasn't taken it the last 2 days secondary to working and being distracted. drinks 1 cup of tea daily. no extra caffeine. denies drug use. no change in meds. no CP. no SOB. no cough. has a cardiologist in [place] but moved to [place] and needs a new cardiologist in the area. Patient came into the ED c/o being in SVT with palpitations x 30 min PTA. patient states h/o SVT x 30 years. takes cardizem 240mg daily but hasn't taken it the last 2 days secondary to working and being distracted. drinks 1 cup of tea daily. no extra caffeine. denies drug use. no change in meds. no CP. no SOB. no cough. has a cardiologist in [place] but moved to [place] and needs a new cardiologist in the area. patient took his normal cardizem dose of 240mg when his symptoms started.

## 2023-11-14 NOTE — ED PROVIDER NOTE - NSICDXPASTMEDICALHX_GEN_ALL_CORE_FT
PAST MEDICAL HISTORY:  HLD (hyperlipidemia)     HTN (hypertension)     CHELSEA (obstructive sleep apnea)     Renal stone     Tachycardia

## 2023-11-14 NOTE — ED PROVIDER NOTE - PATIENT PORTAL LINK FT
You can access the FollowMyHealth Patient Portal offered by [facility] by registering at the following website: [email or web address]. By joining [employer]’s FollowMyHealth portal, you will also be able to view your health information using other applications (apps) compatible with our system.

## 2023-11-14 NOTE — CONSULT NOTE ADULT - SUBJECTIVE AND OBJECTIVE BOX
[facility] - Nisha, Yue, Lissy, Declan, Laura, Gardenia, Benjy; Office Number: [phone]    Initial Consult Note  CHIEF COMPLAINT: Patient is a 43y old  Male who presents with a chief complaint of heart beat rapid, irregular.    HPI: 44 y/o male, PMH of HTN, HLD, right kidney stone, right hydronephrosis and ureteral stone recent cstoscopy, right ureteral catheter placement, right percutaneous nephrolithotomy with tract dilatation and stent insertion. on 9/14/23 came into the ED c/o being in SVT with palpitations x 30 min PTA. patient states h/o SVT x 30 years. takes cardizem 240mg daily but hasn't taken it the last 2 days secondary to working and being distracted.  In ED, T(F): 96.8, HR: 230, BP: 97/84, RR: 18, SpO2: 97%. initial EKG SVT @ 226. S/p adenosine 6 mg IV x1 and NS bolus 1 L IV x 1.     Allergies    No Known Allergies    Intolerances      PAST MEDICAL & SURGICAL HISTORY:  Renal stone      Tachycardia      CHELSEA (obstructive sleep apnea)      HTN (hypertension)      HLD (hyperlipidemia)      S/P left inguinal hernia repair      History of ureteroscopy        MEDICATIONS  (STANDING):    MEDICATIONS  (PRN):    FAMILY HISTORY:  FH: type 2 diabetes (Father)       No family history of acute MI or sudden cardiac death.    SOCIAL HISTORY: No active tobacco, ethanol, or drug abuse.    REVIEW OF SYSTEMS   All other review of systems is negative unless indicated above.    VITAL SIGNS:   Vital Signs Last 24 Hrs  T(C): 36 (14 Nov 2023 15:33), Max: 36 (14 Nov 2023 15:33)  T(F): 96.8 (14 Nov 2023 15:33), Max: 96.8 (14 Nov 2023 15:33)  HR: 230 (14 Nov 2023 15:33) (230 - 230)  BP: 97/84 (14 Nov 2023 15:33) (97/84 - 97/84)  BP(mean): --  RR: 18 (14 Nov 2023 15:33) (18 - 18)  SpO2: 97% (14 Nov 2023 15:33) (97% - 97%)    Parameters below as of 14 Nov 2023 15:33  Patient On (Oxygen Delivery Method): room air        Physical Exam:  Constitutional: NAD, awake and alert  HEENT: Moist Mucous Membranes, Anicteric  Pulmonary: Non-labored, breath sounds are clear bilaterally, No wheezing, rales or rhonchi  Cardiovascular: Regular, S1 and S2, No murmurs, No rubs, gallops or clicks  Gastrointestinal: Bowel Sounds present, soft, nontender.   Lymph: No peripheral edema. No lymphadenopathy.  Skin: No visible rashes or ulcers.  Psych:  Mood & affect appropriate    I&O's Summary      LABS: All Labs Reviewed:                 [facility] - Nisha, Yue, Lissy, Declan, Laura, Gardenia, Benjy; Office Number: [phone]    Initial Consult Note  CHIEF COMPLAINT: Patient is a 43y old  Male who presents with a chief complaint of heart beat rapid, irregular.    HPI: 42 y/o male, PMH of HTN, HLD, right kidney stone, right hydronephrosis and ureteral stone recent cytoscopy right ureteral catheter placement, right percutaneous nephrolithotomy with tract dilatation and stent insertion. on 9/14/23 came into the ED c/o being in SVT with palpitations x 30 min PTA. Patient states h/o SVT x 30 years, diagnosed at the age of 16, was on medicine for 2 years, then was taken off, SVT recurred after age 32 and now takes Cardizem 240mg daily but hasn't taken it the last 2 days secondary to working and being distracted. he was also on a lot of stress for past week. Used to see a cardiologist in [place], but recently moved to [place]. In ED, T(F): 96.8, HR: 230, BP: 97/84, RR: 18, SpO2: 97%. Initial EKG SVT @ 226. S/p adenosine 6 mg IV x1 and NS bolus 1 L IV x 1. Hr now in SR 110s. Rpt EKG with ST @ 116.     Allergies    No Known Allergies    Intolerances      PAST MEDICAL & SURGICAL HISTORY:  Renal stone      Tachycardia      CHELSEA (obstructive sleep apnea)      HTN (hypertension)      HLD (hyperlipidemia)      S/P left inguinal hernia repair      History of ureteroscopy        MEDICATIONS  (STANDING):    MEDICATIONS  (PRN):    FAMILY HISTORY:  FH: type 2 diabetes (Father)       No family history of acute MI or sudden cardiac death.    SOCIAL HISTORY: No active tobacco, ethanol, or drug abuse.    REVIEW OF SYSTEMS   All other review of systems is negative unless indicated above.    VITAL SIGNS:   Vital Signs Last 24 Hrs  T(C): 36 (14 Nov 2023 15:33), Max: 36 (14 Nov 2023 15:33)  T(F): 96.8 (14 Nov 2023 15:33), Max: 96.8 (14 Nov 2023 15:33)  HR: 230 (14 Nov 2023 15:33) (230 - 230)  BP: 97/84 (14 Nov 2023 15:33) (97/84 - 97/84)  BP(mean): --  RR: 18 (14 Nov 2023 15:33) (18 - 18)  SpO2: 97% (14 Nov 2023 15:33) (97% - 97%)    Parameters below as of 14 Nov 2023 15:33  Patient On (Oxygen Delivery Method): room air        Physical Exam:  Constitutional: NAD, awake and alert  HEENT: Moist Mucous Membranes, Anicteric  Pulmonary: Non-labored, breath sounds are clear bilaterally, No wheezing, rales or rhonchi  Cardiovascular: Regular, S1 and S2, No murmurs, No rubs, gallops or clicks  Gastrointestinal: Bowel Sounds present, soft, nontender.   Lymph: No peripheral edema. No lymphadenopathy.  Skin: No visible rashes or ulcers.  Psych:  Mood & affect appropriate    I&O's Summary      LABS: All Labs Reviewed:

## 2023-11-14 NOTE — ED ADULT TRIAGE NOTE - AS HEIGHT TYPE
Paged by RN to speak w/ granddaughter Domitila (reportedly works for administration within [facility]) [phone] at bedside - explained the current hospital course and plan for MRI brain/lumbar spine along w/ RAJI. Family was very concerned at her mental status including her open mouth at bedside. Pt's daughter Cheryl will be present tomorrow morning at 11AM [phone] - please discuss with the family any further information, thank you. stated

## 2023-11-15 LAB
T4 AB SER-ACNC: 7.1 UG/DL — SIGNIFICANT CHANGE UP (ref 4.6–12)
T4 AB SER-ACNC: 7.1 UG/DL — SIGNIFICANT CHANGE UP (ref 4.6–12)

## 2024-01-10 NOTE — PROCEDURE NOTE - SEDATION
I reviewed the H&P, I examined the patient, and there are no changes in the patient's condition.   Provided by Anesthesia Department

## 2024-08-26 ENCOUNTER — APPOINTMENT (OUTPATIENT)
Dept: ORTHOPEDIC SURGERY | Facility: CLINIC | Age: 44
End: 2024-08-26
Payer: COMMERCIAL

## 2024-08-26 VITALS — BODY MASS INDEX: 25.77 KG/M2 | HEIGHT: 70 IN | WEIGHT: 180 LBS

## 2024-08-26 DIAGNOSIS — S63.622A SPRAIN OF INTERPHALANGEAL JOINT OF LEFT THUMB, INITIAL ENCOUNTER: ICD-10-CM

## 2024-08-26 PROCEDURE — 99213 OFFICE O/P EST LOW 20 MIN: CPT

## 2024-08-26 PROCEDURE — 73130 X-RAY EXAM OF HAND: CPT | Mod: LT

## 2024-08-26 RX ORDER — ROSUVASTATIN CALCIUM 10 MG/1
10 TABLET, FILM COATED ORAL
Refills: 0 | Status: ACTIVE | COMMUNITY

## 2024-08-26 NOTE — PLAN
[TextEntry] : The patient was advised of the diagnosis. The natural history of the pathology was explained in full to the patient in layman's terms. All questions were answered. The risks and benefits of surgical and non-surgical treatment alternatives were explained in full to the patient.  The patient was instructed on the importance of ice and elevation of the extremity to decrease swelling and pain.  Finger stak splint applied  F/u in 2 weeks. Repat xrays if needed.

## 2024-08-26 NOTE — HISTORY OF PRESENT ILLNESS
[8] : 8 [4] : 4 [Sharp] : sharp [Intermittent] : intermittent [de-identified] : 8/26/24  Initial visit for this 44 year old male RHD playing cricket x 1 day ago and the ball struck the tip of his lt thumb. C/o pain and swelling since. Went to  where xrays were taken and were ? fx. Placed in a thumb splint. [] : no [FreeTextEntry1] : LT thumb

## 2024-08-26 NOTE — REASON FOR VISIT
[FreeTextEntry2] : NP---LT thumb. Hit by a ball yesterday, swelling and bruising of the thumb. Went to urgent care last night, they stated he may have a slight fracture. Has a brace on.

## 2024-08-26 NOTE — PHYSICAL EXAM
[Able to Communicate] : able to communicate [Well Developed] : well developed [Well Nourished] : well nourished [] : ecchymosis [1st] : 1st [Distal Phalanx] : distal phalanx [IP Joint] : IP joint [Left] : left hand [No acute displaced fracture or dislocation] : No acute displaced fracture or dislocation [de-identified] : swelling and ecchymosis along IP joint and tuft of the distal phalynx [FreeTextEntry1] : ?? small fragment off volar plate IP joint only. No distinct fx lines noted. [FreeTextEntry9] : Flexion and extensor tendons intact

## 2024-09-09 ENCOUNTER — APPOINTMENT (OUTPATIENT)
Dept: ORTHOPEDIC SURGERY | Facility: CLINIC | Age: 44
End: 2024-09-09
Payer: COMMERCIAL

## 2024-09-09 VITALS — WEIGHT: 180 LBS | HEIGHT: 70 IN | BODY MASS INDEX: 25.77 KG/M2

## 2024-09-09 DIAGNOSIS — S63.622A SPRAIN OF INTERPHALANGEAL JOINT OF LEFT THUMB, INITIAL ENCOUNTER: ICD-10-CM

## 2024-09-09 DIAGNOSIS — S63.622D SPRAIN OF INTERPHALANGEAL JOINT OF LEFT THUMB, SUBSEQUENT ENCOUNTER: ICD-10-CM

## 2024-09-09 DIAGNOSIS — S62.525A NONDISPLACED FRACTURE OF DISTAL PHALANX OF LEFT THUMB, INITIAL ENCOUNTER FOR CLOSED FRACTURE: ICD-10-CM

## 2024-09-09 PROCEDURE — 73140 X-RAY EXAM OF FINGER(S): CPT | Mod: LT

## 2024-09-09 PROCEDURE — 99213 OFFICE O/P EST LOW 20 MIN: CPT

## 2024-09-09 NOTE — PLAN
[TextEntry] : The patient was advised of the diagnosis. The natural history of the pathology was explained in full to the patient in layman's terms. All questions were answered. The risks and benefits of surgical and non-surgical treatment alternatives were explained in full to the patient.  May remove splint prn and start rom at home  Reassured the fracture and swelling will continue to resolve over next 2-3 weeks.

## 2024-09-09 NOTE — PHYSICAL EXAM
[Able to Communicate] : able to communicate [Well Developed] : well developed [Well Nourished] : well nourished [] : ecchymosis [1st] : 1st [Distal Phalanx] : distal phalanx [Left] : left hand [Fracture] : Fracture [de-identified] : resolving swelling and ecchymosis along IP joint and tuft of the distal phalynx [de-identified] : Less tender [FreeTextEntry9] : Flexion and extensor tendons intact [FreeTextEntry1] : Small fx thru tip distal phalynx

## 2025-01-28 ENCOUNTER — EMERGENCY (EMERGENCY)
Facility: HOSPITAL | Age: 45
LOS: 1 days | Discharge: HOME | End: 2025-01-28
Attending: STUDENT IN AN ORGANIZED HEALTH CARE EDUCATION/TRAINING PROGRAM | Admitting: STUDENT IN AN ORGANIZED HEALTH CARE EDUCATION/TRAINING PROGRAM
Payer: COMMERCIAL

## 2025-01-28 VITALS
DIASTOLIC BLOOD PRESSURE: 87 MMHG | TEMPERATURE: 98 F | HEART RATE: 218 BPM | SYSTOLIC BLOOD PRESSURE: 147 MMHG | HEIGHT: 71 IN | RESPIRATION RATE: 18 BRPM | OXYGEN SATURATION: 96 % | WEIGHT: 178.57 LBS

## 2025-01-28 VITALS
DIASTOLIC BLOOD PRESSURE: 90 MMHG | HEART RATE: 83 BPM | TEMPERATURE: 98 F | SYSTOLIC BLOOD PRESSURE: 114 MMHG | OXYGEN SATURATION: 96 % | RESPIRATION RATE: 18 BRPM

## 2025-01-28 DIAGNOSIS — Z98.890 OTHER SPECIFIED POSTPROCEDURAL STATES: Chronic | ICD-10-CM

## 2025-01-28 LAB
ALBUMIN SERPL ELPH-MCNC: 3.8 G/DL — SIGNIFICANT CHANGE UP (ref 3.3–5)
ALP SERPL-CCNC: 88 U/L — SIGNIFICANT CHANGE UP (ref 40–120)
ALT FLD-CCNC: 81 U/L — HIGH (ref 12–78)
ANION GAP SERPL CALC-SCNC: 3 MMOL/L — LOW (ref 5–17)
AST SERPL-CCNC: 85 U/L — HIGH (ref 15–37)
BASOPHILS # BLD AUTO: 0.04 K/UL — SIGNIFICANT CHANGE UP (ref 0–0.2)
BASOPHILS NFR BLD AUTO: 0.4 % — SIGNIFICANT CHANGE UP (ref 0–2)
BILIRUB SERPL-MCNC: 0.5 MG/DL — SIGNIFICANT CHANGE UP (ref 0.2–1.2)
BUN SERPL-MCNC: 16 MG/DL — SIGNIFICANT CHANGE UP (ref 7–23)
CALCIUM SERPL-MCNC: 9.3 MG/DL — SIGNIFICANT CHANGE UP (ref 8.5–10.1)
CHLORIDE SERPL-SCNC: 108 MMOL/L — SIGNIFICANT CHANGE UP (ref 96–108)
CO2 SERPL-SCNC: 30 MMOL/L — SIGNIFICANT CHANGE UP (ref 22–31)
CREAT SERPL-MCNC: 1.2 MG/DL — SIGNIFICANT CHANGE UP (ref 0.5–1.3)
EGFR: 77 ML/MIN/1.73M2 — SIGNIFICANT CHANGE UP
EOSINOPHIL # BLD AUTO: 0.16 K/UL — SIGNIFICANT CHANGE UP (ref 0–0.5)
EOSINOPHIL NFR BLD AUTO: 1.6 % — SIGNIFICANT CHANGE UP (ref 0–6)
GLUCOSE SERPL-MCNC: 144 MG/DL — HIGH (ref 70–99)
HCT VFR BLD CALC: 45.3 % — SIGNIFICANT CHANGE UP (ref 39–50)
HGB BLD-MCNC: 16.4 G/DL — SIGNIFICANT CHANGE UP (ref 13–17)
IMM GRANULOCYTES NFR BLD AUTO: 0.4 % — SIGNIFICANT CHANGE UP (ref 0–0.9)
LIDOCAIN IGE QN: 39 U/L — SIGNIFICANT CHANGE UP (ref 13–75)
LYMPHOCYTES # BLD AUTO: 4.49 K/UL — HIGH (ref 1–3.3)
LYMPHOCYTES # BLD AUTO: 46 % — HIGH (ref 13–44)
MAGNESIUM SERPL-MCNC: 2.2 MG/DL — SIGNIFICANT CHANGE UP (ref 1.6–2.6)
MCHC RBC-ENTMCNC: 31.2 PG — SIGNIFICANT CHANGE UP (ref 27–34)
MCHC RBC-ENTMCNC: 36.2 G/DL — HIGH (ref 32–36)
MCV RBC AUTO: 86.1 FL — SIGNIFICANT CHANGE UP (ref 80–100)
MONOCYTES # BLD AUTO: 0.41 K/UL — SIGNIFICANT CHANGE UP (ref 0–0.9)
MONOCYTES NFR BLD AUTO: 4.2 % — SIGNIFICANT CHANGE UP (ref 2–14)
NEUTROPHILS # BLD AUTO: 4.63 K/UL — SIGNIFICANT CHANGE UP (ref 1.8–7.4)
NEUTROPHILS NFR BLD AUTO: 47.4 % — SIGNIFICANT CHANGE UP (ref 43–77)
NRBC # BLD: 0 /100 WBCS — SIGNIFICANT CHANGE UP (ref 0–0)
NT-PROBNP SERPL-SCNC: 50 PG/ML — SIGNIFICANT CHANGE UP (ref 0–125)
PLATELET # BLD AUTO: 237 K/UL — SIGNIFICANT CHANGE UP (ref 150–400)
POTASSIUM SERPL-MCNC: 3.9 MMOL/L — SIGNIFICANT CHANGE UP (ref 3.5–5.3)
POTASSIUM SERPL-SCNC: 3.9 MMOL/L — SIGNIFICANT CHANGE UP (ref 3.5–5.3)
PROT SERPL-MCNC: 7.4 G/DL — SIGNIFICANT CHANGE UP (ref 6–8.3)
RBC # BLD: 5.26 M/UL — SIGNIFICANT CHANGE UP (ref 4.2–5.8)
RBC # FLD: 13 % — SIGNIFICANT CHANGE UP (ref 10.3–14.5)
SODIUM SERPL-SCNC: 141 MMOL/L — SIGNIFICANT CHANGE UP (ref 135–145)
TROPONIN I, HIGH SENSITIVITY RESULT: 10.5 NG/L — SIGNIFICANT CHANGE UP
WBC # BLD: 9.77 K/UL — SIGNIFICANT CHANGE UP (ref 3.8–10.5)
WBC # FLD AUTO: 9.77 K/UL — SIGNIFICANT CHANGE UP (ref 3.8–10.5)

## 2025-01-28 PROCEDURE — 99285 EMERGENCY DEPT VISIT HI MDM: CPT

## 2025-01-28 PROCEDURE — 93005 ELECTROCARDIOGRAM TRACING: CPT

## 2025-01-28 PROCEDURE — 93010 ELECTROCARDIOGRAM REPORT: CPT

## 2025-01-28 PROCEDURE — 85025 COMPLETE CBC W/AUTO DIFF WBC: CPT

## 2025-01-28 PROCEDURE — 71045 X-RAY EXAM CHEST 1 VIEW: CPT

## 2025-01-28 PROCEDURE — 83735 ASSAY OF MAGNESIUM: CPT

## 2025-01-28 PROCEDURE — 71045 X-RAY EXAM CHEST 1 VIEW: CPT | Mod: 26

## 2025-01-28 PROCEDURE — 80053 COMPREHEN METABOLIC PANEL: CPT

## 2025-01-28 PROCEDURE — 96374 THER/PROPH/DIAG INJ IV PUSH: CPT

## 2025-01-28 PROCEDURE — 83690 ASSAY OF LIPASE: CPT

## 2025-01-28 PROCEDURE — 84484 ASSAY OF TROPONIN QUANT: CPT

## 2025-01-28 PROCEDURE — 99291 CRITICAL CARE FIRST HOUR: CPT | Mod: 25

## 2025-01-28 PROCEDURE — 36415 COLL VENOUS BLD VENIPUNCTURE: CPT

## 2025-01-28 PROCEDURE — 99291 CRITICAL CARE FIRST HOUR: CPT

## 2025-01-28 PROCEDURE — 83880 ASSAY OF NATRIURETIC PEPTIDE: CPT

## 2025-01-28 RX ORDER — POTASSIUM CHLORIDE 600 MG/1
40 TABLET, FILM COATED, EXTENDED RELEASE ORAL ONCE
Refills: 0 | Status: COMPLETED | OUTPATIENT
Start: 2025-01-28 | End: 2025-01-28

## 2025-01-28 RX ORDER — ADENOSINE 3 MG/ML
6 INJECTION, SOLUTION INTRAVENOUS ONCE
Refills: 0 | Status: COMPLETED | OUTPATIENT
Start: 2025-01-28 | End: 2025-01-28

## 2025-01-28 RX ORDER — ADENOSINE 3 MG/ML
12 INJECTION, SOLUTION INTRAVENOUS ONCE
Refills: 0 | Status: COMPLETED | OUTPATIENT
Start: 2025-01-28 | End: 2025-01-28

## 2025-01-28 RX ADMIN — POTASSIUM CHLORIDE 40 MILLIEQUIVALENT(S): 600 TABLET, FILM COATED, EXTENDED RELEASE ORAL at 19:20

## 2025-01-28 RX ADMIN — ADENOSINE 12 MILLIGRAM(S): 3 INJECTION, SOLUTION INTRAVENOUS at 16:00

## 2025-01-28 RX ADMIN — ADENOSINE 6 MILLIGRAM(S): 3 INJECTION, SOLUTION INTRAVENOUS at 16:20

## 2025-01-28 NOTE — ED PROVIDER NOTE - CRITICAL CARE ATTENDING CONTRIBUTION TO CARE
Flor ATTG   pt required adenosine Was cleared after 12 mg given 6 mg first cardiology consulted patient had SVT critical arrhythmia

## 2025-01-28 NOTE — ED ADULT NURSE NOTE - OBJECTIVE STATEMENT
NOted .  SVT.  Large bore IV initiated.  Adenosine 6mg then 12mg given.  Pt sypmtomatic.  Lightheaded.  Dizzy.

## 2025-01-28 NOTE — CONSULT NOTE ADULT - ASSESSMENT
43 y/o male, PMH of HTN, HLD,  SVT x 30+years, right kidney stone, right hydronephrosis and ureteral stone, right percutaneous nephrolithotomy with tract dilatation and stent insertion 9/14/23, presented with palpitations that started around 1:45-2 pm. He attempted reassures to break SVT at home, but did not work and decided to come to ER for evaluation.      SVT  - Patient states he was diagnosed at the age of 16, was on medicine for 2 years, then was taken off, SVT recurred after age 32 and now takes Cardizem 240mg daily but hasn't been consistent past week and might have skipped doses. He was under a lot of work meetings and had more coffee than he should have had.    - Used to see a cardiologist in [place], but moved to [place] and had seen no cardiologist since.   - In ER, HR was in 200s, likely short RP tachycardia, S/p adenosine 6 mg IV x1 and then 12 mg and converted to ST @ 109.   - Tele w/ ST @ 100s.   - Continue Cardizem 240 mg PO daily.  - No evidence of any active ischemia   - No evidence of any meaningful volume overload   - BP stable and controlled   - Patient ok for discharge based on cardiovascular parameters,  - Pt would likely to consider SVT ablation and is going to follow up with Dr Manriquez out patient.   - Monitor and replete lytes, keep K>4, Mg>2.    Arleen Newman, MS FNP, AGACNP  Nurse Practitioner- Cardiology   Please call on TEAMS

## 2025-01-28 NOTE — ED PROVIDER NOTE - PATIENT PORTAL LINK FT
You can access the FollowMyHealth Patient Portal offered by [facility] by registering at the following website: [email or web address]. By joining [facility]’s FollowMyHealth portal, you will also be able to view your health information using other applications (apps) compatible with our system.

## 2025-01-28 NOTE — CONSULT NOTE ADULT - NS ATTEND AMEND GEN_ALL_CORE FT
45 y/o male, PMH of HTN, HLD,  SVT x 30+years, right kidney stone, right hydronephrosis and ureteral stone, right percutaneous nephrolithotomy with tract dilatation and stent insertion 9/14/23, presented with palpitations that started around 1:45-2 pm. He attempted reassures to break SVT at home, but did not work and decided to come to ER for evaluation.      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 s/p adenosine and broke.   No signs of significant ischemia or volume overload.   Monitor and replete electrolytes. Keep K>4.0 and Mg>2.0.   cont ccb  discussed options with pt. will fu in office if ed w/u neg. Will get tte and refereal to EP for possible ablation

## 2025-01-28 NOTE — ED PROVIDER NOTE - NSFOLLOWUPINSTRUCTIONS_ED_ALL_ED_FT
Supraventricular tachycardia (SVT) is a kind of abnormal heartbeat. It makes your heart beat very fast. This may last for just a short time. Or it may last longer and need treatment to get the heartbeat to go back to normal.    A normal resting heartbeat is 60–100 times a minute. SVT can make your heart beat more than 150 times a minute.    The times when you have a fast heartbeat—or episodes of SVT—can be scary. But they're usually not dangerous. In some cases, SVT can lead to other heart problems.    What are the causes?  Outline of an adult body, with a close-up of the heart, showing extra electrical signals being sent.  SVT happens when electrical signals are sent out from areas of the upper part of the heart that don't normally send heartbeat signals.    What increases the risk?  You are more likely to get SVT if you are:  Middle aged or older.  Female.  Other things that may increase your risk include:  Having stress or feeling worried or nervous.  Using illegal drugs such as cocaine or methamphetamine.  Using over-the-counter cough or cold medicines.  Stimulant drugs, such as caffeine.  Smoking or alcohol use.  Having any of these conditions:  A thyroid condition.  Diabetes.  Obstructive sleep apnea.  What are the signs or symptoms?  A pounding heartbeat.  Feeling fast or irregular heartbeats called palpitations.  Shortness of breath.  Weakness and tiredness.  Tightness or pain in your chest.  Feeling light-headed or dizzy.  Feeling worried or nervous.  Sometimes there are no symptoms.    How is this diagnosed?  This condition may be diagnosed based on:  Your symptoms and a physical exam. Your health care provider will listen to your heart and feel your pulse.  Tests. These may include:  An electrocardiogram (ECG). This test checks for problems with electrical activity in the heart.  A Holter monitor or event monitor test. For this test, you'll wear a device that monitors your heart rate over time.  An echocardiogram. This test uses sound waves to make a picture of your heart.  A stress echocardiogram. An echocardiogram is done when you are at rest and after exercise.  Blood tests.  An electrophysiology study (EPS). This tests the electrical activity in your heart. It helps find where the abnormal heart rhythm is coming from.  How is this treated?  Treatment may include:  Vagal nerve stimulation. Doing things to stimulate a nerve called the vagus nerve can slow down the heartbeat. Work with your provider to find which technique works best for you. Ways to do this include:  Holding your breath and pushing, as though you are pooping.  Putting gentle pressure on the neck. Do not try this yourself. Only a provider should do this. If done the wrong way, it can lead to a stroke.  Medicines that prevent attacks.  Medicine to stop an attack. This is given through an IV at the hospital.  Cardioversion. This uses a small electric shock to stop an attack.  Catheter ablation. This is a procedure to get rid of cells in the area that's causing the fast heartbeats.  If you don't have symptoms, you may not need treatment.    Follow these instructions at home:  Stress    Avoid things that make you feel stressed.  Find healthy ways to deal with stress, such as:  Doing yoga or meditation.  Going out in nature.  Listening to relaxing music.  Taking steps to be healthy, such as getting lots of sleep, exercising, and eating a balanced diet.  Talking with a mental health provider.  Lifestyle    Try to get at least 7 hours of sleep each night.  Do not smoke, vape, or use products with nicotine or tobacco in them. If you need help quitting, talk with your provider.  Do not use illegal drugs. If you need help quitting, talk with your provider.  Do not drink alcohol if it gives you a fast heartbeat.  If alcohol does not seem to give you a fast heartbeat, limit your alcohol use. If you drink alcohol:  Limit how much you have to:  0–1 drink a day if you are female.  0–2 drinks a day if you are male.  Know how much alcohol is in your drink. In the [place] one drink is one 12 oz bottle of beer (355 mL), one 5 oz glass of wine (148 mL), or one 1½ oz glass of hard liquor (44 mL).  Be aware of how caffeine affects you.  If caffeine gives you a fast heartbeat, do not eat, drink, or use anything with caffeine in it.  If caffeine doesn't seem to give you a fast heartbeat, limit how much caffeine you have.  General instructions    Stay at a healthy weight.  Exercise often. Ask your provider about good activities for you. Try one or a mixture of these:  150 minutes a week of gentle exercise, like walking or yoga.  75 minutes a week of exercise that is very active, like running or swimming.  Do vagal nerve stimulation only as told by your provider.  Take medicines only as told by your provider.  Keep all follow-up visits. Your provider will want to make sure your treatments are working.  Contact a health care provider if:  You have a fast heartbeat more often.  The feeling that your heart is beating fast lasts longer than before.  Home treatments to slow down your heartbeat do not help.  You have new symptoms.  Get help right away if:  You have chest pain.  Your heart beats very fast for more than 20 minutes.  You have trouble breathing.  You faint.  Your symptoms get worse.  These symptoms may be an emergency. Call 911 right away.  Do not wait to see if the symptoms will go away.  Do not drive yourself to the hospital.  This information is not intended to replace advice given to you by your health care provider. Make sure you discuss any questions you have with your health care provider.

## 2025-01-28 NOTE — ED PROVIDER NOTE - CARE PROVIDER_API CALL
Teresa Manriquez  Cardiology  [address] [place] [address]  Phone: [phone]  Fax: [phone]  Follow Up Time: Urgent

## 2025-01-28 NOTE — CONSULT NOTE ADULT - NS ATTEND BILL GEN_ALL_CORE
Chief Complaint   Patient presents with    Well Child     6mo   This patient is accompanied in the office by her mother. Mother states child is at day care during the day. Mother states she is concerned with jolie eyes crossing. Mother denies any other concerns. 1. Have you been to the ER, urgent care clinic since your last visit? Hospitalized since your last visit? No    2. Have you seen or consulted any other health care providers outside of the [address] [place] [address] since your last visit? Include any pap smears or colon screening.  No Attending to bill

## 2025-01-28 NOTE — PHARMACOTHERAPY INTERVENTION NOTE - COMMENTS
Patient presenting in SVT and is indicated to receive adenosine. Worked with ED team to dose and prepare adenosine 6 mg x1 followed by 12 mg x1. Orders entered per discussion with Dr. Serrano.

## 2025-01-28 NOTE — ED PROVIDER NOTE - CLINICAL SUMMARY MEDICAL DECISION MAKING FREE TEXT BOX
Flor ATTG    42-year-old male history of hypertension of lipidemia SVT multiple times chief complaint of palpitation started around 2 PM has had SVT before tried to break it at home unsuccessful.  No chest pain associate with it.  Patient usually gets adenosine for his symptoms patient takes Cardizem 240 mg once a day has not missed any doses but did take some caffeine extra usage yesterday.    GENERAL: Awake, alert, NAD  LUNGS:  non labored breathing   CARDIAC: tachy no m/r/g  ABDOMEN: Soft, , non tender, non distended, no rebound, no guarding  BACK: No midline spinal tenderness, no CVA tenderness  EXT: No edema, no calf tenderness no deformities.  NEURO: A&Ox3. Moving all extremities.  SKIN: Warm and dry. No rash.  PSYCH: Normal affect.    Given history and physical exam patient to receive adenosine with cardiology follow-up.  Patient critical

## 2025-01-28 NOTE — ED ADULT NURSE NOTE - NSFALLUNIVINTERV_ED_ALL_ED
Bed/Stretcher in lowest position, wheels locked, appropriate side rails in place/Call bell, personal items and telephone in reach/Instruct patient to call for assistance before getting out of bed/chair/stretcher/Non-slip footwear applied when patient is off stretcher/[place] to call system/Physically safe environment - no spills, clutter or unnecessary equipment/Purposeful proactive rounding/Room/bathroom lighting operational, light cord in reach

## 2025-01-28 NOTE — CONSULT NOTE ADULT - SUBJECTIVE AND OBJECTIVE BOX
[facility] - Yue Cameron, Lissy, Declan, Laura, [place], Benjy; Office Number: [phone]    Initial Consult Note  CHIEF COMPLAINT: Patient is a 44y old  Male who presents with a chief complaint of palpitations  HPI: 45 y/o male, PMH of HTN, HLD,  SVT x 30+years, right kidney stone, right hydronephrosis and ureteral stone, right percutaneous nephrolithotomy with tract dilatation and stent insertion 9/14/23, presneted with palpitations that started around 1:45-2 pm. He attempted reassures to break SVT at home, but did not work and decided to come to ER for evaluation.  Patient states he was diagnosed at the age of 16, was on medicine for 2 years, then was taken off, SVT recurred after age 32 and now takes Cardizem 240mg daily but hasn't been consistent past week and might have skipped doses. He was under a lot of work meetings and had more coffee than he should have had.  Used to see a cardiologist in [place], but moved to [place] and had seen no cardiologist since. He was in ER 11/2023 for similar SVT and was d/c home after adenosine Initial EKG SVT @ 111. S/p adenosine 6 mg IV x1 and then 12 mg and converted to ST @ 109. Tele w/ ST @ 100s.     Allergies    No Known Allergies    Intolerances      PAST MEDICAL & SURGICAL HISTORY:  Renal stone      Tachycardia      CHELSEA (obstructive sleep apnea)      HTN (hypertension)      HLD (hyperlipidemia)      S/P left inguinal hernia repair      History of ureteroscopy        MEDICATIONS  (STANDING):  aDENosine Injectable (ADENOCARD) 6 milliGRAM(s) IV Push once  aDENosine Injectable (ADENOCARD) 12 milliGRAM(s) IV Push once    MEDICATIONS  (PRN):    FAMILY HISTORY:  FH: type 2 diabetes (Father)       No family history of acute MI or sudden cardiac death.    SOCIAL HISTORY: No active tobacco, ethanol, or drug abuse.    REVIEW OF SYSTEMS   All other review of systems is negative unless indicated above.    VITAL SIGNS:   Vital Signs Last 24 Hrs  T(C): 36.4 (28 Jan 2025 16:04), Max: 36.4 (28 Jan 2025 16:04)  T(F): 97.6 (28 Jan 2025 16:04), Max: 97.6 (28 Jan 2025 16:04)  HR: 105 (28 Jan 2025 16:33) (105 - 218)  BP: 130/100 (28 Jan 2025 16:33) (130/100 - 147/87)  BP(mean): --  RR: 18 (28 Jan 2025 16:04) (18 - 18)  SpO2: 96% (28 Jan 2025 16:04) (96% - 96%)    Parameters below as of 28 Jan 2025 16:04  Patient On (Oxygen Delivery Method): room air    Physical Exam:  Constitutional: NAD, awake and alert  HEENT: Moist Mucous Membranes, Anicteric  Pulmonary: Non-labored, breath sounds are clear bilaterally, No wheezing, rales or rhonchi  Cardiovascular: Regular, S1 and S2, No murmurs, No rubs, gallops or clicks  Gastrointestinal: Bowel Sounds present, soft, nontender.   Lymph: No peripheral edema. No lymphadenopathy.  Skin: No visible rashes or ulcers.  Psych:  Mood & affect appropriate    I&O's Summary      LABS: All Labs Reviewed:                        16.4   9.77  )-----------( 237      ( 28 Jan 2025 16:30 )             45.3

## 2025-01-29 DIAGNOSIS — I47.10 SUPRAVENTRICULAR TACHYCARDIA, UNSPECIFIED: ICD-10-CM

## 2025-02-13 ENCOUNTER — APPOINTMENT (OUTPATIENT)
Dept: CARDIOLOGY | Facility: CLINIC | Age: 45
End: 2025-02-13
Payer: COMMERCIAL

## 2025-02-13 ENCOUNTER — NON-APPOINTMENT (OUTPATIENT)
Age: 45
End: 2025-02-13

## 2025-02-13 VITALS — DIASTOLIC BLOOD PRESSURE: 70 MMHG | SYSTOLIC BLOOD PRESSURE: 110 MMHG

## 2025-02-13 VITALS
HEART RATE: 83 BPM | BODY MASS INDEX: 26.48 KG/M2 | SYSTOLIC BLOOD PRESSURE: 138 MMHG | DIASTOLIC BLOOD PRESSURE: 90 MMHG | HEIGHT: 70 IN | OXYGEN SATURATION: 98 % | WEIGHT: 185 LBS

## 2025-02-13 DIAGNOSIS — I47.10 SUPRAVENTRICULAR TACHYCARDIA, UNSPECIFIED: ICD-10-CM

## 2025-02-13 DIAGNOSIS — E78.5 HYPERLIPIDEMIA, UNSPECIFIED: ICD-10-CM

## 2025-02-13 DIAGNOSIS — I10 ESSENTIAL (PRIMARY) HYPERTENSION: ICD-10-CM

## 2025-02-13 PROCEDURE — 93000 ELECTROCARDIOGRAM COMPLETE: CPT

## 2025-02-13 PROCEDURE — G2211 COMPLEX E/M VISIT ADD ON: CPT | Mod: NC

## 2025-02-13 PROCEDURE — 99215 OFFICE O/P EST HI 40 MIN: CPT

## 2025-02-13 RX ORDER — DILTIAZEM HYDROCHLORIDE 240 MG/1
240 CAPSULE, EXTENDED RELEASE ORAL
Qty: 90 | Refills: 3 | Status: ACTIVE | COMMUNITY
Start: 2025-02-13 | End: 1900-01-01

## 2025-02-14 NOTE — H&P PST ADULT - NSICDXFAMILYHX_GEN_ALL_CORE_FT
Moving from bed to stretcher
FAMILY HISTORY:  Father  Still living? Yes, Estimated age: 61-70  FH: type 2 diabetes, Age at diagnosis: Age Unknown

## 2025-03-06 ENCOUNTER — APPOINTMENT (OUTPATIENT)
Dept: ELECTROPHYSIOLOGY | Facility: CLINIC | Age: 45
End: 2025-03-06

## 2025-03-13 RX ORDER — METOPROLOL SUCCINATE 50 MG/1
50 TABLET, EXTENDED RELEASE ORAL DAILY
Qty: 90 | Refills: 3 | Status: ACTIVE | COMMUNITY
Start: 2025-03-08

## 2025-05-09 ENCOUNTER — NON-APPOINTMENT (OUTPATIENT)
Age: 45
End: 2025-05-09

## 2025-05-09 ENCOUNTER — APPOINTMENT (OUTPATIENT)
Dept: CARDIOLOGY | Facility: CLINIC | Age: 45
End: 2025-05-09
Payer: COMMERCIAL

## 2025-05-09 VITALS
DIASTOLIC BLOOD PRESSURE: 82 MMHG | HEART RATE: 71 BPM | BODY MASS INDEX: 26.63 KG/M2 | HEIGHT: 70 IN | WEIGHT: 186 LBS | SYSTOLIC BLOOD PRESSURE: 128 MMHG | OXYGEN SATURATION: 96 %

## 2025-05-09 DIAGNOSIS — I47.10 SUPRAVENTRICULAR TACHYCARDIA, UNSPECIFIED: ICD-10-CM

## 2025-05-09 DIAGNOSIS — I10 ESSENTIAL (PRIMARY) HYPERTENSION: ICD-10-CM

## 2025-05-09 PROCEDURE — 99214 OFFICE O/P EST MOD 30 MIN: CPT

## 2025-05-09 PROCEDURE — G2211 COMPLEX E/M VISIT ADD ON: CPT | Mod: NC

## 2025-05-09 PROCEDURE — 93000 ELECTROCARDIOGRAM COMPLETE: CPT

## 2025-05-20 ENCOUNTER — APPOINTMENT (OUTPATIENT)
Dept: CARDIOLOGY | Facility: CLINIC | Age: 45
End: 2025-05-20

## 2025-08-27 ENCOUNTER — APPOINTMENT (OUTPATIENT)
Dept: CARDIOLOGY | Facility: CLINIC | Age: 45
End: 2025-08-27

## (undated) DEVICE — DRAPE SNAP KOVER 36X28

## (undated) DEVICE — GLV 8 PROTEXIS (WHITE)

## (undated) DEVICE — SEAL BIOPSY PORT ADJUSTABLE UP TO 9F

## (undated) DEVICE — PRESSURE INFUSOR BAG 1000ML

## (undated) DEVICE — VENODYNE/SCD SLEEVE CALF MEDIUM

## (undated) DEVICE — VENODYNE/SCD SLEEVE CALF LARGE

## (undated) DEVICE — SOL INJ NS 0.9% 1000ML

## (undated) DEVICE — DRAPE C ARM UNIVERSAL

## (undated) DEVICE — PLV-SCD MACHINE: Type: DURABLE MEDICAL EQUIPMENT

## (undated) DEVICE — ONCORE 26 INSUFLATION DEVICE

## (undated) DEVICE — FOLEY TRAY 14FR 5CC LTX UMETER CLOSED

## (undated) DEVICE — DRAPE DRAINAGE BAG URO CATCH II

## (undated) DEVICE — UROVAC

## (undated) DEVICE — SOL IRR BAG NS 0.9% 3000ML

## (undated) DEVICE — PACK CYSTO

## (undated) DEVICE — WARMING BLANKET UPPER ADULT

## (undated) DEVICE — SOL IRR POUR H2O 1000ML

## (undated) DEVICE — FLOORMAT SURGISAFE ABSORBANT WHITE 36" X 72"

## (undated) DEVICE — DRAPE TOWEL BLUE 17" X 24"

## (undated) DEVICE — GOWN LG

## (undated) DEVICE — CLAMP BX URETERSP FLEX 1MM 15CM

## (undated) DEVICE — SOL IRR BAG H2O 3000ML

## (undated) DEVICE — SYR LUER LOK 10CC

## (undated) DEVICE — TUBING TUR 4 PRONG